# Patient Record
Sex: FEMALE | Race: BLACK OR AFRICAN AMERICAN | NOT HISPANIC OR LATINO | Employment: FULL TIME | URBAN - METROPOLITAN AREA
[De-identification: names, ages, dates, MRNs, and addresses within clinical notes are randomized per-mention and may not be internally consistent; named-entity substitution may affect disease eponyms.]

---

## 2019-11-08 ENCOUNTER — HOSPITAL ENCOUNTER (EMERGENCY)
Facility: HOSPITAL | Age: 34
Discharge: HOME | End: 2019-11-08
Attending: EMERGENCY MEDICINE | Admitting: EMERGENCY MEDICINE
Payer: OTHER MISCELLANEOUS

## 2019-11-08 ENCOUNTER — APPOINTMENT (EMERGENCY)
Dept: RADIOLOGY | Facility: HOSPITAL | Age: 34
End: 2019-11-08
Payer: OTHER MISCELLANEOUS

## 2019-11-08 VITALS
SYSTOLIC BLOOD PRESSURE: 122 MMHG | DIASTOLIC BLOOD PRESSURE: 88 MMHG | HEART RATE: 73 BPM | TEMPERATURE: 98.3 F | RESPIRATION RATE: 18 BRPM | OXYGEN SATURATION: 100 %

## 2019-11-08 DIAGNOSIS — S00.83XA CONTUSION OF JAW, INITIAL ENCOUNTER: ICD-10-CM

## 2019-11-08 DIAGNOSIS — S09.90XA CLOSED HEAD INJURY, INITIAL ENCOUNTER: Primary | ICD-10-CM

## 2019-11-08 PROCEDURE — 70110 X-RAY EXAM OF JAW 4/> VIEWS: CPT

## 2019-11-08 PROCEDURE — 99283 EMERGENCY DEPT VISIT LOW MDM: CPT | Performed by: EMERGENCY MEDICINE

## 2019-11-08 PROCEDURE — 99284 EMERGENCY DEPT VISIT MOD MDM: CPT

## 2019-11-08 RX ORDER — NAPROXEN 500 MG/1
500 TABLET ORAL 2 TIMES DAILY WITH MEALS
Qty: 20 TABLET | Refills: 0 | Status: SHIPPED | OUTPATIENT
Start: 2019-11-08 | End: 2020-03-03
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
## 2020-02-26 ENCOUNTER — OFFICE VISIT (OUTPATIENT)
Dept: URGENT CARE | Facility: CLINIC | Age: 35
End: 2020-02-26

## 2020-02-26 VITALS
WEIGHT: 139 LBS | RESPIRATION RATE: 18 BRPM | SYSTOLIC BLOOD PRESSURE: 122 MMHG | DIASTOLIC BLOOD PRESSURE: 76 MMHG | BODY MASS INDEX: 25.58 KG/M2 | TEMPERATURE: 98.4 F | HEIGHT: 62 IN | OXYGEN SATURATION: 99 % | HEART RATE: 84 BPM

## 2020-02-26 DIAGNOSIS — J02.9 SORE THROAT: Primary | ICD-10-CM

## 2020-02-26 LAB — S PYO AG THROAT QL: NEGATIVE

## 2020-02-26 PROCEDURE — 87070 CULTURE OTHR SPECIMN AEROBIC: CPT | Performed by: PHYSICIAN ASSISTANT

## 2020-02-26 PROCEDURE — G0382 LEV 3 HOSP TYPE B ED VISIT: HCPCS | Performed by: PHYSICIAN ASSISTANT

## 2020-02-26 PROCEDURE — 87880 STREP A ASSAY W/OPTIC: CPT | Performed by: PHYSICIAN ASSISTANT

## 2020-02-26 NOTE — PROGRESS NOTES
[facility]        NAME: Lu Mcadams is a 29 y o  female  : 1985    MRN: [id number]  DATE: 2020  TIME: 6:09 PM    Assessment and Plan   Sore throat [J02 9]  1  Sore throat  POCT rapid strepA    Throat culture         Patient Instructions     Patient Instructions   -Rapid strep test was negative and throat culture is pending- I will call you if it comes back positive  -Use tylenol/motrin as directed  -Salt H20 gargles/throat lozenges  -Increase fluids  -Follow-up with PCP within 5-7 days    Go to ER with worsening symptoms, worsening pain, high fever, difficulty swallowing, or any signs of distress     Follow up with PCP in 3-5 days  Proceed to  ER if symptoms worsen  Chief Complaint     Chief Complaint   Patient presents with    Sore Throat     pt states symptoms started 4 days ago         History of Present Illness       The patient presents today for an evaluation of a sore throat for the past 4 days  The patient rates her pain as a 4/10  She has been taking tylenol and motrin  No fever  She admits to having general malaise and some nausea  LMP was in mid January  Review of Systems   Review of Systems   Constitutional: Negative for chills and fever  HENT: Positive for sore throat  Negative for ear pain and rhinorrhea  Respiratory: Negative for shortness of breath  Cardiovascular: Negative for chest pain  Gastrointestinal: Positive for nausea  Negative for abdominal pain and vomiting  Genitourinary: Negative for dysuria  Musculoskeletal: Negative for arthralgias  Skin: Negative for rash  All other systems reviewed and are negative          Current Medications       Current Outpatient Medications:     naproxen (EC NAPROSYN) 500 MG EC tablet, Take 1 tablet (500 mg total) by mouth 2 (two) times a day with meals for 10 days, Disp: 20 tablet, Rfl: 0    Current Allergies     Allergies as of 2020 - Reviewed 2020   Allergen Reaction Noted    Pepcid [famotidine]  11/08/2019    Shellfish-derived products  11/08/2019            The following portions of the patient's history were reviewed and updated as appropriate: allergies, current medications, past family history, past medical history, past social history, past surgical history and problem list      History reviewed  No pertinent past medical history  Past Surgical History:   Procedure Laterality Date    KNEE ARTHROSCOPY         History reviewed  No pertinent family history  Medications have been verified  Objective   /76   Pulse 84   Temp 98 4 °F (36 9 °C) (Temporal)   Resp 18   Ht 5' 2" (1 575 m)   Wt 63 kg (139 lb)   SpO2 99%   BMI 25 42 kg/m²        Physical Exam     Physical Exam   Constitutional: She is oriented to person, place, and time  She appears well-developed and well-nourished  No distress  HENT:   Head: Normocephalic and atraumatic  Right Ear: Tympanic membrane, external ear and ear canal normal    Left Ear: Tympanic membrane, external ear and ear canal normal    Nose: Nose normal    Mouth/Throat: Uvula is midline and mucous membranes are normal  Posterior oropharyngeal erythema present  Tonsils are 0 on the right  Tonsils are 0 on the left  Eyes: Pupils are equal, round, and reactive to light  Conjunctivae and EOM are normal    Neck: Normal range of motion  Neck supple  Cardiovascular: Normal rate, regular rhythm and normal heart sounds  Pulmonary/Chest: Effort normal and breath sounds normal    Lymphadenopathy:     She has no cervical adenopathy  Neurological: She is alert and oriented to person, place, and time  Skin: Skin is warm and dry  Psychiatric: She has a normal mood and affect  Nursing note and vitals reviewed

## 2020-02-28 LAB — BACTERIA THROAT CULT: NORMAL

## 2020-03-01 ENCOUNTER — TELEPHONE (OUTPATIENT)
Dept: URGENT CARE | Facility: CLINIC | Age: 35
End: 2020-03-01

## 2020-03-01 NOTE — TELEPHONE ENCOUNTER
pt called requesting results of throat culture done at urgent care on 2/26/20  informed pt the throat culture was negative  Pt states she still has a sore throat  Instructed pt to follow up with pcp  Pt states she does not have one  Informed pt she can come to urgent care to be re evaluated or call and set up apt with pcp  Pt verbalized understanding

## 2020-03-03 ENCOUNTER — OFFICE VISIT (OUTPATIENT)
Dept: URGENT CARE | Facility: CLINIC | Age: 35
End: 2020-03-03
Payer: COMMERCIAL

## 2020-03-03 VITALS
HEIGHT: 62 IN | BODY MASS INDEX: 25.58 KG/M2 | OXYGEN SATURATION: 99 % | DIASTOLIC BLOOD PRESSURE: 80 MMHG | SYSTOLIC BLOOD PRESSURE: 102 MMHG | WEIGHT: 139 LBS | RESPIRATION RATE: 18 BRPM | TEMPERATURE: 98.6 F | HEART RATE: 75 BPM

## 2020-03-03 DIAGNOSIS — J02.9 ACUTE PHARYNGITIS, UNSPECIFIED ETIOLOGY: Primary | ICD-10-CM

## 2020-03-03 LAB — S PYO AG THROAT QL: NEGATIVE

## 2020-03-03 PROCEDURE — 87070 CULTURE OTHR SPECIMN AEROBIC: CPT | Performed by: PHYSICIAN ASSISTANT

## 2020-03-03 PROCEDURE — 99213 OFFICE O/P EST LOW 20 MIN: CPT | Performed by: PHYSICIAN ASSISTANT

## 2020-03-03 PROCEDURE — 87880 STREP A ASSAY W/OPTIC: CPT | Performed by: PHYSICIAN ASSISTANT

## 2020-03-03 NOTE — PROGRESS NOTES
[facility]        NAME: Jennifer Watt is a 29 y o  female  : 1985    MRN: [id number]  DATE: March 3, 2020  TIME: 4:47 PM    Assessment and Plan   Acute pharyngitis, unspecified etiology [J02 9]  1  Acute pharyngitis, unspecified etiology  POCT rapid strepA    Throat culture         Patient Instructions   Patient Instructions   Schedule follow up with PCP today  Go to ER with worsening symptoms, difficulty breathing or swallowing  Continue nasal saline  Tylenol and motrin for throat pain  Cool liquids, soft foods  Throat lozenges  Awaiting throat culture results  Follow up with PCP in 3-5 days  Proceed to  ER if symptoms worsen  Chief Complaint     Chief Complaint   Patient presents with    Sore Throat     x 2 weeks  also complains of fatigue  recently found out she was pregnant since she was here last           History of Present Illness       70-year-old female presents to clinic with sore throat x2 weeks  Patient was seen here previously and diagnosed with viral sore throat  Throat culture was negative for strep  She states continues sore throat which she has been taking Tylenol and Motrin with little relief  Patient this coverage she was pregnant recently  She denies difficulty breathing or swallowing  Patient does not have a primary care  Review of Systems   Review of Systems   Constitutional: Positive for fatigue  Negative for chills and fever  HENT: Positive for postnasal drip and sore throat  Negative for congestion, ear pain, rhinorrhea, sinus pressure and voice change  Eyes: Negative for discharge and redness  Respiratory: Negative for cough, shortness of breath and wheezing  Cardiovascular: Negative for chest pain  Gastrointestinal: Negative for diarrhea, nausea and vomiting  Musculoskeletal: Negative for myalgias  Neurological: Negative for dizziness and headaches  Hematological: Negative for adenopathy           Current Medications Current Outpatient Medications:     Prenatal Multivit-Min-Fe-FA (PRE-SUHA PO), Take by mouth, Disp: , Rfl:     Current Allergies     Allergies as of 2020 - Reviewed 2020   Allergen Reaction Noted    Pepcid [famotidine]  2019    Shellfish-derived products  2019            The following portions of the patient's history were reviewed and updated as appropriate: allergies, current medications, past family history, past medical history, past social history, past surgical history and problem list      History reviewed  No pertinent past medical history  Past Surgical History:   Procedure Laterality Date    KNEE ARTHROSCOPY         History reviewed  No pertinent family history  Medications have been verified  Objective   /80 (BP Location: Left arm, Patient Position: Sitting)   Pulse 75   Temp 98 6 °F (37 °C) (Oral)   Resp 18   Ht 5' 2" (1 575 m)   Wt 63 kg (139 lb)   LMP 2020 (LMP Unknown)   SpO2 99%   BMI 25 42 kg/m²        Physical Exam     Physical Exam   Constitutional: She appears well-developed and well-nourished  She does not appear ill  HENT:   Head: Normocephalic and atraumatic  Right Ear: Tympanic membrane normal    Left Ear: Tympanic membrane normal    Nose: Nose normal    Mouth/Throat: Uvula is midline and mucous membranes are normal  Posterior oropharyngeal edema and posterior oropharyngeal erythema present  No oropharyngeal exudate or tonsillar abscesses  Tonsils are 0 on the right  Tonsils are 0 on the left  No tonsillar exudate  Cardiovascular: Normal rate  Pulmonary/Chest: Effort normal    Lymphadenopathy:     She has no cervical adenopathy  Skin: No rash noted  Vitals reviewed

## 2020-03-06 ENCOUNTER — APPOINTMENT (OUTPATIENT)
Dept: LAB | Facility: CLINIC | Age: 35
End: 2020-03-06
Payer: COMMERCIAL

## 2020-03-06 ENCOUNTER — OFFICE VISIT (OUTPATIENT)
Dept: FAMILY MEDICINE CLINIC | Facility: CLINIC | Age: 35
End: 2020-03-06
Payer: COMMERCIAL

## 2020-03-06 VITALS
SYSTOLIC BLOOD PRESSURE: 126 MMHG | HEART RATE: 86 BPM | HEIGHT: 62 IN | OXYGEN SATURATION: 99 % | DIASTOLIC BLOOD PRESSURE: 84 MMHG | TEMPERATURE: 99.6 F | BODY MASS INDEX: 24.92 KG/M2 | WEIGHT: 135.4 LBS

## 2020-03-06 DIAGNOSIS — N92.6 MISSED PERIOD: ICD-10-CM

## 2020-03-06 DIAGNOSIS — J02.9 VIRAL PHARYNGITIS: ICD-10-CM

## 2020-03-06 DIAGNOSIS — Z12.4 SCREENING FOR CERVICAL CANCER: ICD-10-CM

## 2020-03-06 LAB
BACTERIA THROAT CULT: NORMAL
BASOPHILS # BLD AUTO: 0.06 THOUSANDS/ΜL (ref 0–0.1)
BASOPHILS NFR BLD AUTO: 1 % (ref 0–1)
EOSINOPHIL # BLD AUTO: 0.1 THOUSAND/ΜL (ref 0–0.61)
EOSINOPHIL NFR BLD AUTO: 1 % (ref 0–6)
ERYTHROCYTE [DISTWIDTH] IN BLOOD BY AUTOMATED COUNT: 11.7 % (ref 11.6–15.1)
HCG SERPL QL: POSITIVE
HCT VFR BLD AUTO: 38.2 % (ref 34.8–46.1)
HGB BLD-MCNC: 12.7 G/DL (ref 11.5–15.4)
IMM GRANULOCYTES # BLD AUTO: 0.04 THOUSAND/UL (ref 0–0.2)
IMM GRANULOCYTES NFR BLD AUTO: 0 % (ref 0–2)
LYMPHOCYTES # BLD AUTO: 2.63 THOUSANDS/ΜL (ref 0.6–4.47)
LYMPHOCYTES NFR BLD AUTO: 25 % (ref 14–44)
MCH RBC QN AUTO: 30.2 PG (ref 26.8–34.3)
MCHC RBC AUTO-ENTMCNC: 33.2 G/DL (ref 31.4–37.4)
MCV RBC AUTO: 91 FL (ref 82–98)
MONOCYTES # BLD AUTO: 0.75 THOUSAND/ΜL (ref 0.17–1.22)
MONOCYTES NFR BLD AUTO: 7 % (ref 4–12)
NEUTROPHILS # BLD AUTO: 7.08 THOUSANDS/ΜL (ref 1.85–7.62)
NEUTS SEG NFR BLD AUTO: 66 % (ref 43–75)
NRBC BLD AUTO-RTO: 0 /100 WBCS
PLATELET # BLD AUTO: 285 THOUSANDS/UL (ref 149–390)
PMV BLD AUTO: 11.6 FL (ref 8.9–12.7)
RBC # BLD AUTO: 4.21 MILLION/UL (ref 3.81–5.12)
WBC # BLD AUTO: 10.66 THOUSAND/UL (ref 4.31–10.16)

## 2020-03-06 PROCEDURE — 1036F TOBACCO NON-USER: CPT | Performed by: NURSE PRACTITIONER

## 2020-03-06 PROCEDURE — 85025 COMPLETE CBC W/AUTO DIFF WBC: CPT

## 2020-03-06 PROCEDURE — 3008F BODY MASS INDEX DOCD: CPT | Performed by: NURSE PRACTITIONER

## 2020-03-06 PROCEDURE — 36415 COLL VENOUS BLD VENIPUNCTURE: CPT

## 2020-03-06 PROCEDURE — 99203 OFFICE O/P NEW LOW 30 MIN: CPT | Performed by: NURSE PRACTITIONER

## 2020-03-06 PROCEDURE — 86308 HETEROPHILE ANTIBODY SCREEN: CPT

## 2020-03-06 PROCEDURE — 84703 CHORIONIC GONADOTROPIN ASSAY: CPT

## 2020-03-06 NOTE — ASSESSMENT & PLAN NOTE
multiple urgent car visit, will rule out mono  continue supportive care, salt water gargles, pain relief medication  Try flonase at hs which may help with PND and throat irritation

## 2020-03-06 NOTE — PROGRESS NOTES
OFFICE VISIT  Rocio Watt 29 y o  female MRN: [id number]          Assessment / Plan:  Problem List Items Addressed This Visit        Digestive    Viral pharyngitis     multiple urgent car visit, will rule out mono  continue supportive care, salt water gargles, pain relief medication  Try flonase at hs which may help with PND and throat irritation  Relevant Orders    CBC and differential    Mononucleosis screen      Other Visit Diagnoses     Missed period        Relevant Orders    Pregnancy Test (HCG Qualitative)    Screening for cervical cancer        Relevant Orders    Ambulatory referral to Obstetrics / Gynecology            Reason For Visit / Chief Complaint  Chief Complaint   Patient presents with   [facility]      patient here following up with urgent care for sore throat and fatigue  was advised bloodwork possible mono   pt is now pregnant         HPI:  Rocio Watt is a 29 y o  female who presents today to [facility] care  She has been having ongoign sore thorat since feb 20, she was seen twice at urgent care, she was tested for strep, negatgive  She was not treated with any medicaotn  She remains with a sore throat, she has been using tylneo and motired  She teports pain when swallowing  She reports fatigued  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  She reports missing her period, in february, took home pregnancy in which was posiitive  She reports fatigue   She reports morning sickness    Historical Information   Past Medical History:   Diagnosis Date    Hypertension     with pregnancy    Pre-eclampsia      Past Surgical History:   Procedure Laterality Date    KNEE ARTHROSCOPY       Social History   Social History     Substance and Sexual Activity   Alcohol Use Not Currently    Comment: socially     Social History     Substance and Sexual Activity   Drug Use Never     Social History     Tobacco Use   Smoking Status Never Smoker   Smokeless Tobacco Never Used     Family History   Problem Relation Age of Onset    Asthma Mother     Anemia Mother    Soni Sims Arthritis Mother        Meds/Allergies   Allergies   Allergen Reactions    Pepcid [Famotidine]     Shellfish-Derived Products        Meds:    Current Outpatient Medications:     Prenatal Multivit-Min-Fe-FA (PRE-SUHA PO), Take by mouth, Disp: , Rfl:       REVIEW OF SYSTEMS  Review of Systems   Constitutional: Positive for fatigue  Negative for chills and fever  HENT: Positive for postnasal drip and sore throat  Negative for congestion, ear discharge, ear pain, trouble swallowing and voice change  Eyes: Negative for pain and redness  Respiratory: Positive for cough  Negative for chest tightness, shortness of breath and wheezing  Gastrointestinal: Positive for nausea  Negative for abdominal pain, blood in stool, constipation, diarrhea and vomiting  Endocrine: Negative for cold intolerance, heat intolerance, polydipsia, polyphagia and polyuria  Genitourinary: Positive for menstrual problem  Negative for decreased urine volume, dysuria, frequency and urgency  Musculoskeletal: Negative for arthralgias, back pain, myalgias and neck pain  Skin: Negative for color change and rash  Neurological: Negative for dizziness, syncope, weakness, light-headedness, numbness and headaches  Psychiatric/Behavioral: Negative for sleep disturbance and suicidal ideas  The patient is not nervous/anxious  Current Vitals:   Blood Pressure: 126/84 (20 1103)  Pulse: 86 (20 1103)  Temperature: 99 6 °F (37 6 °C) (20 1103)  Height: 5' 2" (157 5 cm) (20 1103)  Weight - Scale: 61 4 kg (135 lb 6 4 oz) (20 1103)  SpO2: 99 % (20 1103)  [unfilled]    PHYSICAL EXAMS:  Physical Exam   Constitutional: She is oriented to person, place, and time  She appears well-developed and well-nourished  HENT:   Head: Normocephalic     Right Ear: External ear normal    Left Ear: External ear normal    Mouth/Throat: Oropharynx is clear and moist        Eyes: Pupils are equal, round, and reactive to light  Conjunctivae are normal    Neck: Neck supple  Cardiovascular: Normal rate and regular rhythm  Pulmonary/Chest: Effort normal and breath sounds normal    Abdominal: Soft  Bowel sounds are normal  She exhibits no distension  There is no tenderness  Musculoskeletal: Normal range of motion  Neurological: She is alert and oriented to person, place, and time  Skin: Skin is warm and dry  Psychiatric: She has a normal mood and affect  Lab, imaging and other studies: I have personally reviewed pertinent reports  Deepali Tom

## 2020-03-09 DIAGNOSIS — Z32.01 POSITIVE PREGNANCY TEST: Primary | ICD-10-CM

## 2020-03-09 LAB — HETEROPH AB SER QL: NEGATIVE

## 2020-05-28 ENCOUNTER — TELEPHONE (OUTPATIENT)
Dept: FAMILY MEDICINE CLINIC | Facility: CLINIC | Age: 35
End: 2020-05-28

## 2020-06-09 ENCOUNTER — TELEPHONE (OUTPATIENT)
Dept: FAMILY MEDICINE CLINIC | Facility: CLINIC | Age: 35
End: 2020-06-09

## 2020-07-01 ENCOUNTER — OFFICE VISIT (OUTPATIENT)
Dept: FAMILY MEDICINE CLINIC | Facility: CLINIC | Age: 35
End: 2020-07-01
Payer: COMMERCIAL

## 2020-07-01 VITALS
WEIGHT: 144 LBS | HEIGHT: 62 IN | HEART RATE: 78 BPM | SYSTOLIC BLOOD PRESSURE: 118 MMHG | DIASTOLIC BLOOD PRESSURE: 70 MMHG | OXYGEN SATURATION: 98 % | BODY MASS INDEX: 26.5 KG/M2

## 2020-07-01 DIAGNOSIS — Z00.00 ANNUAL PHYSICAL EXAM: Primary | ICD-10-CM

## 2020-07-01 PROCEDURE — 99395 PREV VISIT EST AGE 18-39: CPT | Performed by: NURSE PRACTITIONER

## 2020-07-01 PROCEDURE — 3008F BODY MASS INDEX DOCD: CPT | Performed by: NURSE PRACTITIONER

## 2020-07-01 NOTE — PROGRESS NOTES
ADULT ANNUAL [address]    NAME: Jaz lOvera  AGE: 28 y o  SEX: female  : 1985     DATE: 2020     Assessment and Plan:     Problem List Items Addressed This Visit     None      Visit Diagnoses     Annual physical exam    -  Primary          Immunizations and preventive care screenings were discussed with patient today  Appropriate education was printed on patient's after visit summary  Counseling:  Alcohol/drug use: discussed moderation in alcohol intake, the recommendations for healthy alcohol use, and avoidance of illicit drug use  Dental Health: discussed importance of regular tooth brushing, flossing, and dental visits  Injury prevention: discussed safety/seat belts, safety helmets, smoke detectors, carbon dioxide detectors, and smoking near bedding or upholstery  Sexual health: discussed sexually transmitted diseases, partner selection, use of condoms, avoidance of unintended pregnancy, and contraceptive alternatives  · Exercise: the importance of regular exercise/physical activity was discussed  Recommend exercise 3-5 times per week for at least 30 minutes  Return in about 1 year (around 2021)  Chief Complaint:     Chief Complaint   Patient presents with    Physical Exam     pt is in office today for a general physical pt is 23 weeks pregnant      History of Present Illness:     Adult Annual Physical   Patient here for a comprehensive physical exam  The patient reports no problems  She is in need of school physcial for college  She is currently 23 weeks pregnant    Diet and Physical Activity  · Diet/Nutrition: well balanced diet, limited junk food, consuming 3-5 servings of fruits/vegetables daily, adequate fiber intake and going to increase fiber intake / decrease white breads/pastas to eliminate occasional Colace use     · Exercise: no formal exercise and encouraged walking as tolerated, gettting up, moving around, and not remaining sedentary for any length of time         Depression Screening  PHQ-9 Depression Screening    PHQ-9:    Frequency of the following problems over the past two weeks:            General Health  · Sleep: sleeps well, gets 4-6 hours of sleep on average and reports 4-8 hours of sleep, daughter wakes at 7am daily, sleeps well, sometimes difficulty falling asleep      · Hearing: no concerns at this time  · Vision: wears glasses and last exam April 2020, current prescription less than one year old, no concerns with vision at this time      · Dental: no dental visits for >1 year, brushes teeth twice daily, does not floss and denies any current problems, encouraged flossing daily and patient in agreement to find dental home for exam/cleaning as soon as possible  /GYN Health  · Last menstrual period: Approximately January 15th, currently pregnant, following with OB and MFM  · Contraceptive method: N/A  · History of STDs?: no      Review of Systems:     Review of Systems   Constitutional: Negative for chills, fatigue and fever  Morning sickness stopped at about 14 weeks, reports increased dietary intake since  HENT: Negative for congestion, ear discharge, ear pain, sore throat, trouble swallowing and voice change  Eyes: Negative for pain and redness  Respiratory: Negative for cough, chest tightness, shortness of breath and wheezing  Cardiovascular:        See note below  History of a-fib during second pregnancy  Gastrointestinal: Negative for abdominal pain, blood in stool, constipation, diarrhea, nausea and vomiting  Endocrine: Negative for cold intolerance, heat intolerance, polydipsia, polyphagia and polyuria  Genitourinary: Negative for decreased urine volume, dysuria, frequency and urgency  Musculoskeletal: Negative for arthralgias, back pain, myalgias and neck pain  Skin: Negative for color change and rash     Neurological: Negative for dizziness, syncope, weakness, light-headedness, numbness and headaches  Psychiatric/Behavioral: Negative for sleep disturbance and suicidal ideas  The patient is not nervous/anxious  Patient reports history of a-fib and pre-eclampsia with 2nd pregnancy  Following with cardiology  Reports that two week Holter monitor did not capture and a-fib activity  Reports that she will be starting ASA 81mg twice daily per MFM recommendation soon       Past Medical History:     Past Medical History:   Diagnosis Date    Hypertension     with pregnancy    Pre-eclampsia         Past Surgical History:     Past Surgical History:   Procedure Laterality Date    KNEE ARTHROSCOPY        Social History:     E-Cigarette/Vaping    E-Cigarette Use Never User      E-Cigarette/Vaping Substances    Nicotine No     THC No     CBD No     Flavoring No     Other No     Unknown No      Social History     Socioeconomic History    Marital status: Single     Spouse name: None    Number of children: None    Years of education: None    Highest education level: None   Occupational History    None   Social Needs    Financial resource strain: None    Food insecurity:     Worry: None     Inability: None    Transportation needs:     Medical: None     Non-medical: None   Tobacco Use    Smoking status: Never Smoker    Smokeless tobacco: Never Used   Substance and Sexual Activity    Alcohol use: Not Currently     Comment: socially    Drug use: Never    Sexual activity: None   Lifestyle    Physical activity:     Days per week: None     Minutes per session: None    Stress: None   Relationships    Social connections:     Talks on phone: None     Gets together: None     Attends Cheondoism service: None     Active member of club or organization: None     Attends meetings of clubs or organizations: None     Relationship status: None    Intimate partner violence:     Fear of current or ex partner: None     Emotionally abused: None Physically abused: None     Forced sexual activity: None   Other Topics Concern    None   Social History Narrative    None      Family History:     Family History   Problem Relation Age of Onset    Asthma Mother     Anemia Mother     Arthritis Mother       Current Medications:     Current Outpatient Medications   Medication Sig Dispense Refill    Prenatal Multivit-Min-Fe-FA (PRE- PO) Take by mouth       No current facility-administered medications for this visit  Tolerating pre-wang vitamin, per patient recommended to take ASA 81mg twice daily by MFM to reduce risk of pre-eclampsia and a-fib  Patient has not started aspirin  Allergies: Allergies   Allergen Reactions    Pepcid [Famotidine]     Shellfish-Derived Products       Physical Exam:     /70 (BP Location: Left arm, Patient Position: Sitting)   Pulse 78   Ht 5' 2" (1 575 m)   Wt 65 3 kg (144 lb)   LMP 2020 (LMP Unknown)   SpO2 98%   BMI 26 34 kg/m²     Physical Exam   Constitutional: She is oriented to person, place, and time  She appears well-developed and well-nourished  HENT:   Head: Normocephalic and atraumatic  Right Ear: External ear normal    Left Ear: External ear normal    Mouth/Throat: Oropharynx is clear and moist    Eyes: Pupils are equal, round, and reactive to light  Neck: Normal range of motion  Neck supple  Cardiovascular: Normal rate and regular rhythm  Pulmonary/Chest: Effort normal    Abdominal:   Round, 23 weeks pregnant   Musculoskeletal: Normal range of motion  Neurological: She is alert and oriented to person, place, and time  Skin: Skin is warm  Psychiatric: She has a normal mood and affect  Her behavior is normal    Nursing note and vitals reviewed        [address]

## 2020-07-01 NOTE — PATIENT INSTRUCTIONS

## 2020-07-02 ENCOUNTER — HOSPITAL ENCOUNTER (EMERGENCY)
Facility: HOSPITAL | Age: 35
Discharge: HOME | End: 2020-07-02
Attending: EMERGENCY MEDICINE | Admitting: EMERGENCY MEDICINE
Payer: COMMERCIAL

## 2020-07-02 VITALS
TEMPERATURE: 98.3 F | HEIGHT: 62 IN | HEART RATE: 88 BPM | RESPIRATION RATE: 16 BRPM | WEIGHT: 144 LBS | BODY MASS INDEX: 26.5 KG/M2 | SYSTOLIC BLOOD PRESSURE: 105 MMHG | OXYGEN SATURATION: 99 % | DIASTOLIC BLOOD PRESSURE: 66 MMHG

## 2020-07-02 DIAGNOSIS — R00.2 PALPITATIONS: Primary | ICD-10-CM

## 2020-07-02 PROCEDURE — 99285 EMERGENCY DEPT VISIT HI MDM: CPT

## 2020-07-02 PROCEDURE — 99282 EMERGENCY DEPT VISIT SF MDM: CPT | Performed by: EMERGENCY MEDICINE

## 2020-07-02 PROCEDURE — 93005 ELECTROCARDIOGRAM TRACING: CPT

## 2020-07-02 NOTE — ED PROVIDER NOTES
History  Chief Complaint   Patient presents with    Palpitations     Patient reports palpitations on and off since yesterday  Patient reports nausea, fatigue, and "crying alot"  Patient reports she is 23 weeks pregnant and has a history of a-fiob when pregnant  HPI patient is a 35-year-old female,  presents emergency department and apparently off and on has felt some palpitations  Patient reports she had a history of atrial fibrillation with her previous pregnancy  Patient reports she was started on aspirin for that pregnancy when they found she had the atrial fibrillation  Patient reports she had palpitations earlier in this pregnancy she had a Holter monitor which showed no atrial fibrillation  Patient reports she sees a cardiologist and they follow her closely  Patient reports last night she had some extra beats and she reports that night she has this feeling of extra beats so she came to the emergency department  Patient denies any shortness of breath  There is no chest pain  She denies any diaphoresis  Patient denies any pain with exertion  She reports she believes her pregnancy is developing normally  Past medical history of hypertension with pregnancy, preeclampsia, atrial fibrillation in pregnancy, currently pregnant  Family history noncontributory  Social history, nonsmoker no history of drug abuse    Prior to Admission Medications   Prescriptions Last Dose Informant Patient Reported? Taking? Prenatal Multivit-Min-Fe-FA (PRE-SUHA PO)   Yes No   Sig: Take by mouth      Facility-Administered Medications: None       Past Medical History:   Diagnosis Date    Hypertension     with pregnancy    Pre-eclampsia        Past Surgical History:   Procedure Laterality Date    KNEE ARTHROSCOPY         Family History   Problem Relation Age of Onset    Asthma Mother     Anemia Mother     Arthritis Mother      I have reviewed and agree with the history as documented      E-Cigarette/Vaping    E-Cigarette Use Never User      E-Cigarette/Vaping Substances    Nicotine No     THC No     CBD No     Flavoring No     Other No     Unknown No      Social History     Tobacco Use    Smoking status: Never Smoker    Smokeless tobacco: Never Used   Substance Use Topics    Alcohol use: Not Currently     Comment: socially    Drug use: Never       Review of Systems   Constitutional: Negative for diaphoresis, fatigue and fever  HENT: Negative for congestion, ear pain, nosebleeds and sore throat  Eyes: Negative for photophobia, pain, discharge and visual disturbance  Respiratory: Negative for cough, choking, chest tightness, shortness of breath and wheezing  Cardiovascular: Positive for palpitations  Negative for chest pain  Gastrointestinal: Negative for abdominal distention, abdominal pain, diarrhea and vomiting  Genitourinary: Negative for dysuria, flank pain and frequency  Musculoskeletal: Negative for back pain, gait problem and joint swelling  Skin: Negative for color change and rash  Neurological: Negative for dizziness, syncope and headaches  Psychiatric/Behavioral: Negative for behavioral problems and confusion  The patient is not nervous/anxious  All other systems reviewed and are negative  Physical Exam  Physical Exam   Constitutional: She is oriented to person, place, and time  She appears well-developed and well-nourished  HENT:   Head: Normocephalic  Right Ear: External ear normal    Left Ear: External ear normal    Nose: Nose normal    Mouth/Throat: Oropharynx is clear and moist    Eyes: Pupils are equal, round, and reactive to light  EOM and lids are normal    Neck: Normal range of motion  Neck supple  Cardiovascular: Normal rate, regular rhythm, normal heart sounds and intact distal pulses  Pulmonary/Chest: Effort normal and breath sounds normal  No respiratory distress  Abdominal: Soft   Bowel sounds are normal    Gravid uterus, nontender, bedside ultrasound shows a active moving 3rd trimester fetus with fetal heart rate of 140   Musculoskeletal: Normal range of motion  She exhibits no deformity  Neurological: She is alert and oriented to person, place, and time  Skin: Skin is warm and dry  Psychiatric: She has a normal mood and affect  Nursing note and vitals reviewed  Pulse oximetry 98% on room air adequate oxygenation, no hypoxia    Vital Signs  ED Triage Vitals [07/02/20 1910]   Temperature Pulse Respirations Blood Pressure SpO2   98 3 °F (36 8 °C) 86 18 105/68 98 %      Temp Source Heart Rate Source Patient Position - Orthostatic VS BP Location FiO2 (%)   Oral Monitor Lying Left arm --      Pain Score       --           Vitals:    07/02/20 1910 07/02/20 2000   BP: 105/68 105/66   Pulse: 86 88   Patient Position - Orthostatic VS: Lying Lying         Visual Acuity      ED Medications  Medications - No data to display    Diagnostic Studies  Results Reviewed     None                 No orders to display              Procedures  Procedures         ED Course       US AUDIT      Most Recent Value   Initial Alcohol Screen: US AUDIT-C    1  How often do you have a drink containing alcohol?  0 Filed at: 07/02/2020 1911   2  How many drinks containing alcohol do you have on a typical day you are drinking? 0 Filed at: 07/02/2020 1911   3a  Male UNDER 65: How often do you have five or more drinks on one occasion? 0 Filed at: 07/02/2020 1911   3b  FEMALE Any Age, or MALE 65+: How often do you have 4 or more drinks on one occassion? 0 Filed at: 07/02/2020 1911   Audit-C Score  0 Filed at: 07/02/2020 1911                  ABDIAZIZ/DAST-10      Most Recent Value   How many times in the past year have you    Used an illegal drug or used a prescription medication for non-medical reasons?   Never Filed at: 07/02/2020 1911                    The patient was placed on a monitor In the emergency department and monitored she was in normal sinus rhythm the whole time period patient had no ectopic beats there was no atrial fibrillation  MDM medical decision making 51-year-old female, currently pregnant apparently had atrial fibrillation with her 1st pregnancy Holter monitor during this pregnancy showed node atrial fibrillation  Patient reports some funny beats during the night at times  Monitoring here shows normal sinus rhythm no atrial fibrillation  Discussed with patient she may require Holter monitoring to decide if her palpitations are atrial fibrillation at this point there is no sign of atrial fibrillation  I discussed with her she does have a cardiologist she can follow up with her cardiologist they can provide her with Holter monitor which may evaluate and figure out what her palpitations are at this point she has a normal sinus rhythm here in the emergency department there was no indication for further testing  Is no indication for ongoing monitoring here  When patient had atrial fibrillation previously she was primarily treated with aspirin  No indication to start medications at this time as the patient is not in atrial fibrillation  Discussed outpatient treatment follow-up- discussed indications to return  Disposition  Final diagnoses:   Palpitations     Time reflects when diagnosis was documented in both MDM as applicable and the Disposition within this note     Time User Action Codes Description Comment    2020  8:03 PM Roz Ghosh Add [R00 2] Palpitations       ED Disposition     ED Disposition Condition Date/Time Comment    Discharge Stable Thu 2020  8:03 PM Marylene Alken discharge to home/self care  Follow-up Information    None         Discharge Medication List as of 2020  8:04 PM      CONTINUE these medications which have NOT CHANGED    Details   Prenatal Multivit-Min-Fe-FA (PRE- PO) Take by mouth, Historical Med           No discharge procedures on file      PDMP Review       Value Time User    PDMP Reviewed Yes 11/8/2019  7:26 AM Michael Yang MD          ED Provider  Electronically Signed by           Neno Ventura MD  07/04/20 6698

## 2020-07-03 LAB
ATRIAL RATE: 83 BPM
P AXIS: 43 DEGREES
PR INTERVAL: 130 MS
QRS AXIS: 38 DEGREES
QRSD INTERVAL: 82 MS
QT INTERVAL: 356 MS
QTC INTERVAL: 418 MS
T WAVE AXIS: 7 DEGREES
VENTRICULAR RATE: 83 BPM

## 2020-07-03 PROCEDURE — 93010 ELECTROCARDIOGRAM REPORT: CPT | Performed by: INTERNAL MEDICINE

## 2020-07-03 NOTE — DISCHARGE INSTRUCTIONS
Increase liquids  Follow up with your cardiologist for repeat heart monitoring  Follow up with your obstetrician,  For acute emergencies follow-up at the obstetrical hospital

## 2021-01-27 ENCOUNTER — OFFICE VISIT (OUTPATIENT)
Dept: FAMILY MEDICINE CLINIC | Facility: CLINIC | Age: 36
End: 2021-01-27
Payer: COMMERCIAL

## 2021-01-27 VITALS
TEMPERATURE: 97.8 F | DIASTOLIC BLOOD PRESSURE: 70 MMHG | HEIGHT: 62 IN | SYSTOLIC BLOOD PRESSURE: 120 MMHG | WEIGHT: 160 LBS | BODY MASS INDEX: 29.44 KG/M2 | HEART RATE: 80 BPM | OXYGEN SATURATION: 98 %

## 2021-01-27 DIAGNOSIS — Z00.00 ANNUAL PHYSICAL EXAM: Primary | ICD-10-CM

## 2021-01-27 PROBLEM — J02.9 VIRAL PHARYNGITIS: Status: RESOLVED | Noted: 2020-03-06 | Resolved: 2021-01-27

## 2021-01-27 PROBLEM — Z82.79 FAMILY HISTORY OF CONGENITAL HEART DEFECT: Status: ACTIVE | Noted: 2020-03-30

## 2021-01-27 PROBLEM — I48.91 ATRIAL FIBRILLATION (HCC): Status: ACTIVE | Noted: 2020-03-30

## 2021-01-27 PROCEDURE — 99395 PREV VISIT EST AGE 18-39: CPT | Performed by: NURSE PRACTITIONER

## 2021-01-27 PROCEDURE — 3008F BODY MASS INDEX DOCD: CPT | Performed by: NURSE PRACTITIONER

## 2021-01-27 PROCEDURE — 3725F SCREEN DEPRESSION PERFORMED: CPT | Performed by: NURSE PRACTITIONER

## 2021-01-27 PROCEDURE — 1036F TOBACCO NON-USER: CPT | Performed by: NURSE PRACTITIONER

## 2021-01-27 NOTE — PROGRESS NOTES
ADULT ANNUAL [address]    NAME: Zakia Cobos  AGE: 28 y o  SEX: female  : 1985     DATE: 2021     Assessment and Plan:     Problem List Items Addressed This Visit     None      Visit Diagnoses     Annual physical exam    -  Primary          Immunizations and preventive care screenings were discussed with patient today  Appropriate education was printed on patient's after visit summary  Counseling:  Alcohol/drug use: discussed moderation in alcohol intake, the recommendations for healthy alcohol use, and avoidance of illicit drug use  Dental Health: discussed importance of regular tooth brushing, flossing, and dental visits  Injury prevention: discussed safety/seat belts, safety helmets, smoke detectors, carbon dioxide detectors, and smoking near bedding or upholstery  Sexual health: discussed sexually transmitted diseases, partner selection, use of condoms, avoidance of unintended pregnancy, and contraceptive alternatives  · Exercise: the importance of regular exercise/physical activity was discussed  Recommend exercise 3-5 times per week for at least 30 minutes  No follow-ups on file  Chief Complaint:     Chief Complaint   Patient presents with    Physical Exam     needs forms filled out for school       History of Present Illness:     Adult Annual Physical   Patient here for a comprehensive physical exam  The patient reports no problems  She is in  Need of forms for school, needing extra time for testing and havign the ability to pump, currently breastfeeding  Diet and Physical Activity  · Diet/Nutrition: well balanced diet  · Exercise: no formal exercise        Depression Screening  PHQ-9 Depression Screening    PHQ-9:   Frequency of the following problems over the past two weeks:      Little interest or pleasure in doing things: 1 - several days  Feeling down, depressed, or hopeless: 1 - several days  PHQ-2 Score: 2       General Health  · Sleep: sleeps well  · Hearing: normal - bilateral   · Vision: wears glasses  · Dental: regular dental visits  /GYN Health  · Last menstrual period: 1/26/21  · Contraceptive method: none  · History of STDs?: no      Review of Systems:     Review of Systems   Constitutional: Negative for activity change, chills, fatigue and fever  HENT: Negative for congestion, ear discharge, ear pain, sinus pressure, sinus pain, sore throat, tinnitus and trouble swallowing  Eyes: Negative for photophobia, pain, discharge, itching and visual disturbance  Respiratory: Negative for cough, chest tightness, shortness of breath and wheezing  Cardiovascular: Negative for chest pain and leg swelling  Gastrointestinal: Negative for abdominal distention, abdominal pain, constipation, diarrhea, nausea and vomiting  Endocrine: Negative for polydipsia, polyphagia and polyuria  Genitourinary: Negative for dysuria and frequency  Musculoskeletal: Negative for arthralgias, myalgias, neck pain and neck stiffness  Skin: Negative for color change  Neurological: Negative for dizziness, syncope, weakness, numbness and headaches  Hematological: Does not bruise/bleed easily  Psychiatric/Behavioral: Negative for behavioral problems, confusion, self-injury, sleep disturbance and suicidal ideas  The patient is not nervous/anxious         Past Medical History:     Past Medical History:   Diagnosis Date    Hypertension     with pregnancy    Pre-eclampsia       Past Surgical History:     Past Surgical History:   Procedure Laterality Date    KNEE ARTHROSCOPY        Social History:     E-Cigarette/Vaping    E-Cigarette Use Never User      E-Cigarette/Vaping Substances    Nicotine No     THC No     CBD No     Flavoring No     Other No     Unknown No      Social History     Socioeconomic History    Marital status: Single     Spouse name: None    Number of children: None    Years of education: None    Highest education level: None   Occupational History    None   Social Needs    Financial resource strain: None    Food insecurity     Worry: None     Inability: None    Transportation needs     Medical: None     Non-medical: None   Tobacco Use    Smoking status: Never Smoker    Smokeless tobacco: Never Used   Substance and Sexual Activity    Alcohol use: Not Currently     Comment: socially    Drug use: Never    Sexual activity: None   Lifestyle    Physical activity     Days per week: None     Minutes per session: None    Stress: None   Relationships    Social connections     Talks on phone: None     Gets together: None     Attends Anglican service: None     Active member of club or organization: None     Attends meetings of clubs or organizations: None     Relationship status: None    Intimate partner violence     Fear of current or ex partner: None     Emotionally abused: None     Physically abused: None     Forced sexual activity: None   Other Topics Concern    None   Social History Narrative    None      Family History:     Family History   Problem Relation Age of Onset    Asthma Mother     Anemia Mother     Arthritis Mother       Current Medications:     Current Outpatient Medications   Medication Sig Dispense Refill    Prenatal Multivit-Min-Fe-FA (PRE-SUHA PO) Take by mouth       No current facility-administered medications for this visit  Allergies: Allergies   Allergen Reactions    Pepcid [Famotidine]     Shellfish-Derived Products       Physical Exam:     /70 (BP Location: Left arm, Patient Position: Sitting)   Pulse 80   Temp 97 8 °F (36 6 °C)   Ht 5' 2" (1 575 m)   Wt 72 6 kg (160 lb)   LMP 2020 (LMP Unknown)   SpO2 98%   BMI 29 26 kg/m²     Physical Exam  Constitutional:       Appearance: Normal appearance  She is well-developed  HENT:      Head: Normocephalic        Right Ear: Tympanic membrane, ear canal and external ear normal       Left Ear: Tympanic membrane, ear canal and external ear normal       Nose: Nose normal  No congestion or rhinorrhea  Mouth/Throat:      Mouth: Mucous membranes are moist       Pharynx: No oropharyngeal exudate or posterior oropharyngeal erythema  Eyes:      Extraocular Movements: Extraocular movements intact  Conjunctiva/sclera: Conjunctivae normal       Pupils: Pupils are equal, round, and reactive to light  Neck:      Musculoskeletal: Normal range of motion and neck supple  Cardiovascular:      Rate and Rhythm: Normal rate and regular rhythm  Pulses: Normal pulses  Heart sounds: Normal heart sounds  Pulmonary:      Effort: Pulmonary effort is normal       Breath sounds: Normal breath sounds  No wheezing or rhonchi  Abdominal:      General: Bowel sounds are normal  There is no distension  Palpations: Abdomen is soft  Tenderness: There is no abdominal tenderness  Musculoskeletal: Normal range of motion  General: No swelling, tenderness, deformity or signs of injury  Right lower leg: No edema  Left lower leg: No edema  Skin:     General: Skin is warm and dry  Findings: No bruising, erythema, lesion or rash  Neurological:      General: No focal deficit present  Mental Status: She is alert and oriented to person, place, and time  Psychiatric:         Mood and Affect: Mood normal          Behavior: Behavior normal          Thought Content:  Thought content normal          Judgment: Judgment normal           Estela Crawford, [address]

## 2021-01-27 NOTE — PATIENT INSTRUCTIONS

## 2021-04-07 ENCOUNTER — OFFICE VISIT (OUTPATIENT)
Dept: FAMILY MEDICINE CLINIC | Facility: CLINIC | Age: 36
End: 2021-04-07
Payer: COMMERCIAL

## 2021-04-07 DIAGNOSIS — L20.82 FLEXURAL ECZEMA: Primary | ICD-10-CM

## 2021-04-07 PROCEDURE — 99213 OFFICE O/P EST LOW 20 MIN: CPT | Performed by: NURSE PRACTITIONER

## 2021-04-07 RX ORDER — TRIAMCINOLONE ACETONIDE 5 MG/G
CREAM TOPICAL 2 TIMES DAILY
Qty: 30 G | Refills: 3 | Status: SHIPPED | OUTPATIENT
Start: 2021-04-07 | End: 2022-03-03 | Stop reason: ALTCHOICE

## 2021-04-07 NOTE — PATIENT INSTRUCTIONS
Eczema   WHAT YOU NEED TO KNOW:   Eczema, or atopic dermatitis, is an itchy, red skin rash  It is a long-term condition that may cause flare-ups for the rest of your life  DISCHARGE INSTRUCTIONS:   Return to the emergency department if:   · You develop a fever or have red streaks going up your arm or leg  · Your rash gets more swollen, red, or hot    Contact your healthcare provider if:   · Most of your skin is red, swollen, painful, and covered with scales  · You develop bloody, red, painful crusts  · Your skin blisters and oozes white or yellow pus  · You have questions about your condition or care  Medicines:   · Medicines , such as immunosuppressants, help reduce itching, redness, pain, and swelling  They may be given as a cream or pill  You may also receive antihistamines to reduce itching, or antibiotics if you have a skin infection  · Take your medicine as directed  Contact your healthcare provider if you think your medicine is not helping or if you have side effects  Tell him of her if you are allergic to any medicine  Keep a list of the medicines, vitamins, and herbs you take  Include the amounts, and when and why you take them  Bring the list or the pill bottles to follow-up visits  Carry your medicine list with you in case of an emergency  Manage eczema:   · Do not scratch  Pat or press on your skin for relief from itching  Your symptoms will get worse if you scratch  Keep your fingernails short so you do not tear your skin if you do scratch  · Keep your skin moist   Rub lotion, cream or ointment into your skin right after a bath or shower when your skin is still damp  Ask your healthcare provider what to use and how often to use it  · Take baths or showers  with warm water for 10 minutes or less  Use mild bar soap  Ask your healthcare provider for the best soap for you to use  · Wear cotton clothes  Wear loose-fitting clothes made from cotton or cotton blends   Avoid wool      · Use a humidifier  to add moisture to the air in your home  · Avoid changes in temperature , especially activities that cause you to sweat a lot because this can cause itching  Remove blankets from your bed if you get hot while you sleep  · Avoid allergens, dust, and skin irritants  Do not let pets inside your home  Do not use perfume, fabric softener, or makeup that burns or itches  Follow up with your healthcare provider as directed:  Write down your questions so you remember to ask them during your visits  © Copyright [address] Information is for End User's use only and may not be sold, redistributed or otherwise used for commercial purposes  All illustrations and images included in CareNotes® are the copyrighted property of [employer]  or [facility] Beverly Ramirez   The above information is an  only  It is not intended as medical advice for individual conditions or treatments  Talk to your doctor, nurse or pharmacist before following any medical regimen to see if it is safe and effective for you

## 2021-04-07 NOTE — PROGRESS NOTES
OFFICE VISIT  Chapo Paige 39 y o  female MRN: [id number]    BMI Counseling: Body mass index is 29 26 kg/m²  The BMI is above normal  Nutrition recommendations include decreasing portion sizes  Exercise recommendations include moderate physical activity 150 minutes/week  Assessment / Plan:  Problem List Items Addressed This Visit        Musculoskeletal and Integument    Flexural eczema - Primary       May use topical cream twice daily  Reading instructions given for at home care for eczema         Relevant Medications    triamcinolone (KENALOG) 0 5 % cream            Reason For Visit / Chief Complaint  Chief Complaint   Patient presents with    Rash     patient has a rash on right hand     Eczema        HPI:  Chapo Paige is a 39 y o  female who presents today for rash to left hand  She reports this rash is itchy has been worsening over the last couple weeks  She reports a known history of eczema although she has had no recent flare-ups      Historical Information   Past Medical History:   Diagnosis Date    Hypertension     with pregnancy    Pre-eclampsia      Past Surgical History:   Procedure Laterality Date    KNEE ARTHROSCOPY       Social History   Social History     Substance and Sexual Activity   Alcohol Use Not Currently    Comment: socially     Social History     Substance and Sexual Activity   Drug Use Never     Social History     Tobacco Use   Smoking Status Never Smoker   Smokeless Tobacco Never Used     Family History   Problem Relation Age of Onset    Asthma Mother     Anemia Mother     Arthritis Mother        Meds/Allergies   Allergies   Allergen Reactions    Pepcid [Famotidine]     Shellfish-Derived Products - Food Allergy        Meds:    Current Outpatient Medications:     triamcinolone (KENALOG) 0 5 % cream, Apply topically 2 (two) times a day, Disp: 30 g, Rfl: 3      REVIEW OF SYSTEMS  Review of Systems   Constitutional: Negative for activity change, chills, fatigue and fever    HENT: Negative for congestion, ear discharge, ear pain, sinus pressure, sinus pain, sore throat, tinnitus and trouble swallowing  Eyes: Negative for photophobia, pain, discharge, itching and visual disturbance  Respiratory: Negative for cough, chest tightness, shortness of breath and wheezing  Cardiovascular: Negative for chest pain and leg swelling  Gastrointestinal: Negative for abdominal distention, abdominal pain, constipation, diarrhea, nausea and vomiting  Endocrine: Negative for polydipsia, polyphagia and polyuria  Genitourinary: Negative for dysuria and frequency  Musculoskeletal: Negative for arthralgias, myalgias, neck pain and neck stiffness  Skin: Positive for rash  Negative for color change  Neurological: Negative for dizziness, syncope, weakness, numbness and headaches  Hematological: Does not bruise/bleed easily  Psychiatric/Behavioral: Negative for behavioral problems, confusion, self-injury, sleep disturbance and suicidal ideas  The patient is not nervous/anxious  Current Vitals:   Blood Pressure: 112/66 (04/07/21 1058)  Pulse: 62 (04/07/21 1058)  Temperature: 99 2 °F (37 3 °C) (04/07/21 1058)  Height: 5' 2" (157 5 cm) (04/07/21 1058)  Weight - Scale: 72 6 kg (160 lb) (04/07/21 1058)  SpO2: 96 % (04/07/21 1058)  [unfilled]    PHYSICAL EXAMS:  Physical Exam  Constitutional:       Appearance: Normal appearance  She is well-developed  HENT:      Head: Normocephalic  Right Ear: Tympanic membrane, ear canal and external ear normal       Left Ear: Tympanic membrane, ear canal and external ear normal       Nose: Nose normal  No congestion or rhinorrhea  Mouth/Throat:      Mouth: Mucous membranes are moist       Pharynx: No oropharyngeal exudate or posterior oropharyngeal erythema  Eyes:      Extraocular Movements: Extraocular movements intact        Conjunctiva/sclera: Conjunctivae normal       Pupils: Pupils are equal, round, and reactive to light    Neck:      Musculoskeletal: Normal range of motion and neck supple  Cardiovascular:      Rate and Rhythm: Normal rate and regular rhythm  Pulses: Normal pulses  Heart sounds: Normal heart sounds  Pulmonary:      Effort: Pulmonary effort is normal       Breath sounds: Normal breath sounds  No wheezing or rhonchi  Abdominal:      General: Bowel sounds are normal  There is no distension  Palpations: Abdomen is soft  Tenderness: There is no abdominal tenderness  Musculoskeletal: Normal range of motion  General: No swelling, tenderness, deformity or signs of injury  Right lower leg: No edema  Left lower leg: No edema  Skin:     General: Skin is warm and dry  Findings: Rash present  No bruising, erythema or lesion  Comments: Left hand rash noted   Neurological:      General: No focal deficit present  Mental Status: She is alert and oriented to person, place, and time  Psychiatric:         Mood and Affect: Mood normal          Behavior: Behavior normal          Thought Content: Thought content normal          Judgment: Judgment normal              Lab, imaging and other studies: I have personally reviewed pertinent reports  Velasquez Johnson

## 2021-05-12 ENCOUNTER — TELEPHONE (OUTPATIENT)
Dept: ADMINISTRATIVE | Facility: OTHER | Age: 36
End: 2021-05-12

## 2021-05-12 NOTE — TELEPHONE ENCOUNTER
----- Message from Ekaterina Dueñas MA sent at 5/12/2021  9:09 AM EDT -----  Good Morning,  Can someone please take a look patients BMI  Ty  ----- Message -----  From: Roderick Medrano  Sent: 5/6/2021   9:39 AM EDT  To: Case Anand MA, #    Her BMI states not met, but she should be excluded, can this be sent to [facility] for an explanation, I was unable to do bmi followup at her last visit in April 2021

## 2021-06-08 ENCOUNTER — TELEPHONE (OUTPATIENT)
Dept: ADMINISTRATIVE | Facility: OTHER | Age: 36
End: 2021-06-08

## 2021-06-08 NOTE — TELEPHONE ENCOUNTER
----- Message from Bee Mercedes MA sent at 6/4/2021  2:39 PM EDT -----  Regarding: BMI capture  06/04/21 2:40 PM    T[place], our patient Mirtha Montes De Oca has had BMI/performed  Please assist in updating the patient chart by pulling the document from Health Maintenance Tab within Chart Review  The date of service is 4/7/21    Thank you,  LAVELLE Daly PG this was captured and is coming up in our audits as "not captured" is this something you would be able to fix?

## 2021-06-08 NOTE — TELEPHONE ENCOUNTER
Upon review of the In Basket request we have noted this is a NON VALUE BASED item  We are unable to complete this request  Our team has the capability to assist in retrieval, updating, and linking of the following items: Chlamydia, CRC Cologuard, CRC Colonoscopy, CRC CT Colonography, CRC FIT/FOBT(3), CRC Sigmoidoscopy, CT Lungs Screening, DEXA Scan, Diabetic Eye Exam, Diabetic Foot Exam, Hemoglobin A1c, Hepatitis C, HIV (cannot retrieve), Immunizations, Lead, Mammogram, Medicare AWV, Pap Smear (HPV),  and Urine Microalbumin  Any additional questions or concerns should be emailed to the Practice Liaisons via [email or web address] email, please do not reply via In Basket      Thank you  Ed Hidalgo MA

## 2021-06-09 VITALS
TEMPERATURE: 99.2 F | HEIGHT: 62 IN | BODY MASS INDEX: 29.44 KG/M2 | OXYGEN SATURATION: 96 % | DIASTOLIC BLOOD PRESSURE: 66 MMHG | WEIGHT: 160 LBS | SYSTOLIC BLOOD PRESSURE: 112 MMHG | HEART RATE: 62 BPM

## 2021-07-13 ENCOUNTER — HOSPITAL ENCOUNTER (EMERGENCY)
Facility: HOSPITAL | Age: 36
Discharge: HOME | End: 2021-07-14
Attending: EMERGENCY MEDICINE | Admitting: EMERGENCY MEDICINE
Payer: COMMERCIAL

## 2021-07-13 DIAGNOSIS — I10 HYPERTENSION: ICD-10-CM

## 2021-07-13 DIAGNOSIS — R51.9 HEADACHE: Primary | ICD-10-CM

## 2021-07-13 PROCEDURE — 99284 EMERGENCY DEPT VISIT MOD MDM: CPT | Performed by: EMERGENCY MEDICINE

## 2021-07-13 PROCEDURE — 99284 EMERGENCY DEPT VISIT MOD MDM: CPT

## 2021-07-14 ENCOUNTER — APPOINTMENT (EMERGENCY)
Dept: CT IMAGING | Facility: HOSPITAL | Age: 36
End: 2021-07-14
Payer: COMMERCIAL

## 2021-07-14 VITALS
HEART RATE: 78 BPM | DIASTOLIC BLOOD PRESSURE: 59 MMHG | TEMPERATURE: 98.2 F | HEIGHT: 62 IN | RESPIRATION RATE: 18 BRPM | OXYGEN SATURATION: 100 % | SYSTOLIC BLOOD PRESSURE: 91 MMHG | WEIGHT: 160 LBS | BODY MASS INDEX: 29.44 KG/M2

## 2021-07-14 LAB
EXT PREG TEST URINE: NEGATIVE
EXT. CONTROL ED NAV: NORMAL

## 2021-07-14 PROCEDURE — 81025 URINE PREGNANCY TEST: CPT | Performed by: EMERGENCY MEDICINE

## 2021-07-14 PROCEDURE — 70450 CT HEAD/BRAIN W/O DYE: CPT

## 2021-07-14 RX ORDER — ONDANSETRON 4 MG/1
4 TABLET, ORALLY DISINTEGRATING ORAL ONCE
Status: COMPLETED | OUTPATIENT
Start: 2021-07-14 | End: 2021-07-14

## 2021-07-14 RX ORDER — IBUPROFEN 400 MG/1
400 TABLET ORAL ONCE
Status: COMPLETED | OUTPATIENT
Start: 2021-07-14 | End: 2021-07-14

## 2021-07-14 RX ADMIN — IBUPROFEN 400 MG: 400 TABLET ORAL at 00:42

## 2021-07-14 RX ADMIN — ONDANSETRON 4 MG: 4 TABLET, ORALLY DISINTEGRATING ORAL at 00:42

## 2021-07-14 NOTE — ED PROVIDER NOTES
History  Chief Complaint   Patient presents with    High Blood Pressure     pt states she had on and off headaches today  Took her bp at home and it was elevated  Stated on her here her headache went away  No hx of hypertension but hx of preclampsia with pregnancy  39 y o  female presents with several hours of right sided headache with radiation to the right posterior  Described as mild pain that came on gradually, has been waxing and waning, and continues in the ER  Patient states light and noises worsens the pain and ibuprofen improves the pain  Patient denies aura  Patient denies maximal intensity of the headache within minutes of onset  Patient denies exertional component to the headache  Patient states her headache has been waxing and waning throughout the day but improving with ibuprofen  Patient states she took ibuprofen prior to come the emergency room and headache is nearly resolved  Patient states she took her blood pressure and it was elevated so she came to the emergency room for further evaluation treatment  Patient denies a history of hypertension other than during pregnancy  Patient denies any concerns for CO exposure  Patient denies any recent tick bites  Patient denies any recent cervical manipulation  Patient denies any recent trauma  Patient denies any history of connective tissue disorders  Patient denies any history or family history of SAH  Patient denies any history of immunocompromised state  Patient denies any use of illicit drugs  Patient affirms nausea without vomiting  Patient denies any fever or chills  Patient denies any visual changes  Patient denies any sensory changes  Patient denies any focal weakness  ROS: patient denies seizure, weight loss, confusion, diaphoresis, neck pain/stiffness, facial pain, speech difficulty, gait disturbance, vertigo, lightheadedness, jaw claudication, syncope    All other review of systems reviewed and noted to be negative  Focused Objective:  Eyes:  PERRL, EOMI  No nystagmus with gaze fixation  HENT: Atraumatic  Pharynx moist and non-erythematous  No tenderness or swelling over the temporal arteries  Neck: no carotid bruit, no tenderness to palpitation  Able to touch chin to chest   Negative jolt accentuation testing  Neuro:  Alert and answers questions appropriately  No dysarthria  Normal tandem gait, including toe walking and heel walking  Normal Romberg exam   No pronator drift  Normal finger to nose  Normal fine motor function with rapid finger movements  Normal hand tap  Cranial nerves II through XII grossly intact  Visual fields grossly intact  Upper and lower motor strength 5/5 and symmetric  Normal light touch sensory exam    Normal DTRs  Medical Decision Making  Patient presenting with headache representing a broad differential      Considering patient's history and examination, suggested laboratory evaluation and imaging while performing symptomatic management  Extensive discussion with the patient who is declining placement of IV access which I emphasized limits evaluation though the patient states headache has been improving  Patient is amenable to noncontrast CT imaging and treatment with oral medications  Patient appears to have competency in making her medical decisions  I have discussed risks in detail with the patient and alternatives  Will monitor patient, reassess, re-evaluate        History provided by:  Patient  Headache  Pain location:  R temporal  Quality: "pain"  Radiates to:  R neck  Onset quality:  Gradual  Timing:  Constant  Progression:  Waxing and waning  Relieved by:  NSAIDs  Worsened by:  Light and sound  Associated symptoms: nausea    Associated symptoms: no abdominal pain, no back pain, no blurred vision, no congestion, no cough, no diarrhea, no dizziness, no drainage, no ear pain, no facial pain, no fatigue, no fever, no focal weakness, no hearing loss, no loss of balance, no myalgias, no near-syncope, no neck pain, no neck stiffness, no numbness, no paresthesias, no photophobia, no seizures, no sinus pressure, no sore throat, no swollen glands, no syncope, no tingling, no visual change, no vomiting and no weakness        Prior to Admission Medications   Prescriptions Last Dose Informant Patient Reported? Taking?   triamcinolone (KENALOG) 0 5 % cream   No No   Sig: Apply topically 2 (two) times a day      Facility-Administered Medications: None       Past Medical History:   Diagnosis Date    Hypertension     with pregnancy    Pre-eclampsia        Past Surgical History:   Procedure Laterality Date    KNEE ARTHROSCOPY         Family History   Problem Relation Age of Onset    Asthma Mother     Anemia Mother     Arthritis Mother      I have reviewed and agree with the history as documented  E-Cigarette/Vaping    E-Cigarette Use Never User      E-Cigarette/Vaping Substances    Nicotine No     THC No     CBD No     Flavoring No     Other No     Unknown No      Social History     Tobacco Use    Smoking status: Never Smoker    Smokeless tobacco: Never Used   Vaping Use    Vaping Use: Never used   Substance Use Topics    Alcohol use: Not Currently     Comment: socially    Drug use: Never       Review of Systems   Constitutional: Negative for fatigue and fever  HENT: Negative for congestion, ear pain, hearing loss, postnasal drip, sinus pressure and sore throat  Eyes: Negative for blurred vision and photophobia  Respiratory: Negative for cough  Cardiovascular: Negative for syncope and near-syncope  Gastrointestinal: Positive for nausea  Negative for abdominal pain, diarrhea and vomiting  Musculoskeletal: Negative for back pain, myalgias, neck pain and neck stiffness  Neurological: Positive for headaches  Negative for dizziness, focal weakness, seizures, weakness, numbness, paresthesias and loss of balance     All other systems reviewed and are negative  Physical Exam  Physical Exam  Vitals reviewed  Constitutional:       Appearance: She is well-developed  HENT:      Mouth/Throat:      Mouth: Mucous membranes are moist    Eyes:      General: No scleral icterus  Pupils: Pupils are equal, round, and reactive to light  Cardiovascular:      Rate and Rhythm: Normal rate and regular rhythm  Pulses: Normal pulses  Pulmonary:      Effort: Pulmonary effort is normal    Abdominal:      Palpations: Abdomen is soft  Tenderness: There is no abdominal tenderness  There is no guarding or rebound  Musculoskeletal:      Cervical back: Neck supple  Skin:     General: Skin is warm and dry  Neurological:      General: No focal deficit present  Mental Status: She is alert and oriented to person, place, and time  Cranial Nerves: No cranial nerve deficit  Sensory: No sensory deficit  Motor: No weakness        Coordination: Coordination normal       Gait: Gait normal       Deep Tendon Reflexes: Reflexes normal    Psychiatric:         Mood and Affect: Mood normal          Vital Signs  ED Triage Vitals [07/13/21 2359]   Temperature Pulse Respirations Blood Pressure SpO2   98 2 °F (36 8 °C) 66 18 139/88 99 %      Temp Source Heart Rate Source Patient Position - Orthostatic VS BP Location FiO2 (%)   Oral Monitor Lying Right arm --      Pain Score       4           Vitals:    07/13/21 2359 07/14/21 0130   BP: 139/88 91/59   Pulse: 66 78   Patient Position - Orthostatic VS: Lying Lying         Visual Acuity  Visual Acuity      Most Recent Value   L Pupil Size (mm)  3   R Pupil Size (mm)  3          ED Medications  Medications   ondansetron (ZOFRAN-ODT) dispersible tablet 4 mg (4 mg Oral Given 7/14/21 0042)   ibuprofen (MOTRIN) tablet 400 mg (400 mg Oral Given 7/14/21 0042)       Diagnostic Studies  Results Reviewed     Procedure Component Value Units Date/Time    POCT pregnancy, urine [[id number]]  (Normal) Resulted: 07/14/21 0114    Lab Status: Final result Updated: 07/14/21 0114     EXT PREG TEST UR (Ref: Negative) negative     Control valid                 CT head without contrast   Final Result by Janet Swain DO (07/14 0134)      No acute intracranial abnormality is seen  Workstation performed: [id number]                    Procedures  Procedures         ED Course  ED Course as of Jul 14 0149 Wed Jul 14, 2021   1020 Patient had normal creatinine with CMP in November of last year  0145 Patient requesting discharge  Again offered continued evaluation, which patient declined  Discussed follow-up and return precautions in detail  SBIRT 22yo+      Most Recent Value   SBIRT (24 yo +)   In order to provide better care to our patients, we are screening all of our patients for alcohol and drug use  Would it be okay to ask you these screening questions? No Filed at: 07/14/2021 0011                    MDM    Disposition  Final diagnoses:   Headache   Hypertension     Time reflects when diagnosis was documented in both MDM as applicable and the Disposition within this note     Time User Action Codes Description Comment    7/14/2021 12:51 AM Melania Gibson Add [R51 9] Headache     7/14/2021 12:51 AM Jose, [address] Hypertension       ED Disposition     ED Disposition Condition Date/Time Comment    Discharge Stable Wed Jul 14, 2021 12:51 AM Nena Montenegro discharge to home/self care  Follow-up Information     Follow up With Specialties Details Why [address], [address] [facility], Nurse Practitioner Schedule an appointment as soon as possible for a visit in 2 days Follow up and reassessment  Recheck blood pressure   Thaddeus Sparrow [facility] Emergency Department Emergency Medicine Go to  If symptoms worsen [address] [facility] [address] [facility] Emergency Department, [address] [facility], [facility], [address] [place] [address], [id number]          Patient's Medications   Discharge Prescriptions    No medications on file     No discharge procedures on file      PDMP Review       Value Time User    PDMP Reviewed  Yes 11/8/2019  7:26 AM Ulisses Caputo MD          ED Provider  Electronically Signed by           Roberto Meza MD  07/14/21 3614

## 2021-07-22 ENCOUNTER — APPOINTMENT (OUTPATIENT)
Dept: RADIOLOGY | Facility: CLINIC | Age: 36
End: 2021-07-22
Payer: COMMERCIAL

## 2021-07-22 ENCOUNTER — APPOINTMENT (OUTPATIENT)
Dept: LAB | Facility: CLINIC | Age: 36
End: 2021-07-22
Payer: COMMERCIAL

## 2021-07-22 DIAGNOSIS — Z11.1 TUBERCULOSIS SCREENING: ICD-10-CM

## 2021-07-22 DIAGNOSIS — Z01.84 IMMUNITY STATUS TESTING: ICD-10-CM

## 2021-07-22 DIAGNOSIS — Z01.84 IMMUNITY STATUS TESTING: Primary | ICD-10-CM

## 2021-07-22 LAB
HBV CORE AB SER QL: NORMAL
HBV SURFACE AB SER-ACNC: 16.55 MIU/ML
HCV AB SER QL: NORMAL
RUBV IGG SERPL IA-ACNC: 135.5 IU/ML

## 2021-07-22 PROCEDURE — 71046 X-RAY EXAM CHEST 2 VIEWS: CPT

## 2021-07-22 PROCEDURE — 36415 COLL VENOUS BLD VENIPUNCTURE: CPT

## 2021-07-22 PROCEDURE — 86735 MUMPS ANTIBODY: CPT

## 2021-07-22 PROCEDURE — 86765 RUBEOLA ANTIBODY: CPT

## 2021-07-22 PROCEDURE — 86762 RUBELLA ANTIBODY: CPT

## 2021-07-22 PROCEDURE — 86803 HEPATITIS C AB TEST: CPT

## 2021-07-22 PROCEDURE — 86704 HEP B CORE ANTIBODY TOTAL: CPT

## 2021-07-22 PROCEDURE — 86706 HEP B SURFACE ANTIBODY: CPT

## 2021-07-28 ENCOUNTER — OFFICE VISIT (OUTPATIENT)
Dept: FAMILY MEDICINE CLINIC | Facility: CLINIC | Age: 36
End: 2021-07-28
Payer: COMMERCIAL

## 2021-07-28 VITALS
HEIGHT: 62 IN | DIASTOLIC BLOOD PRESSURE: 80 MMHG | TEMPERATURE: 99 F | BODY MASS INDEX: 27.05 KG/M2 | WEIGHT: 147 LBS | SYSTOLIC BLOOD PRESSURE: 116 MMHG | HEART RATE: 77 BPM | OXYGEN SATURATION: 99 %

## 2021-07-28 DIAGNOSIS — F32.A FATIGUE DUE TO DEPRESSION: ICD-10-CM

## 2021-07-28 DIAGNOSIS — F33.1 MODERATE EPISODE OF RECURRENT MAJOR DEPRESSIVE DISORDER (HCC): Primary | ICD-10-CM

## 2021-07-28 DIAGNOSIS — R53.83 FATIGUE DUE TO DEPRESSION: ICD-10-CM

## 2021-07-28 DIAGNOSIS — Z63.4 GRIEF AT LOSS OF CHILD: ICD-10-CM

## 2021-07-28 DIAGNOSIS — F43.21 GRIEF AT LOSS OF CHILD: ICD-10-CM

## 2021-07-28 PROCEDURE — 3725F SCREEN DEPRESSION PERFORMED: CPT | Performed by: NURSE PRACTITIONER

## 2021-07-28 PROCEDURE — 99214 OFFICE O/P EST MOD 30 MIN: CPT | Performed by: NURSE PRACTITIONER

## 2021-07-28 PROCEDURE — 3008F BODY MASS INDEX DOCD: CPT | Performed by: NURSE PRACTITIONER

## 2021-07-28 SDOH — SOCIAL STABILITY - SOCIAL INSECURITY: DISSAPEARANCE AND DEATH OF FAMILY MEMBER: Z63.4

## 2021-07-28 NOTE — PROGRESS NOTES
Depression Screening Follow-up Plan: Patient's depression screening was positive with a PHQ-2 score of 5  Their PHQ-9 score was 11  Patient assessed for underlying major depression  They have no active suicidal ideations  Brief counseling provided and recommend additional follow-up/re-evaluation next office visit  OFFICE VISIT  Elvin Gauthier 39 y o  female MRN: [id number]      Depression Screening and Follow-up Plan: Patient's depression screening was positive with a PHQ-2 score of 5  Their PHQ-9 score was 11  Patient assessed for underlying major depression  Brief counseling provided and recommend additional follow-up/re-evaluation next office visit  Assessment / Plan:  Problem List Items Addressed This Visit        Other    Moderate episode of recurrent major depressive disorder ([facility] [address] ) - Primary    Relevant Orders    Ambulatory referral to Psychiatry    CBC and differential    TSH, 3rd generation with Free T4 reflex    Grief at loss of child     patient would benefit from counseling services, emotioal support provided, referral given  Other Visit Diagnoses     Fatigue due to depression        Relevant Orders    CBC and differential    TSH, 3rd generation with Free T4 reflex    Comprehensive metabolic panel    Vitamin D 25 hydroxy    Vitamin B12            Reason For Visit / Chief Complaint  Chief Complaint   Patient presents with    Follow-up        HPI:  Elvin Gauthier is a 39 y o  female who presents today for depression, she reports a hx of depression has been on  medication in the past, she reports increase fatigued, she will be joining the gym  She wants to hold off on Medication  She reports poor sleeping,  Poor appetite  No motivation Moods stable, sad more than happy, a lot of stress  boyfriend cheated, recent completion of rn school, hesitant to study for [employer], newly applied job position, has two children, has lost a child         Historical Information   Past Medical History: Diagnosis Date    Hypertension     with pregnancy    Pre-eclampsia      Past Surgical History:   Procedure Laterality Date    KNEE ARTHROSCOPY       Social History   Social History     Substance and Sexual Activity   Alcohol Use Not Currently    Comment: socially     Social History     Substance and Sexual Activity   Drug Use Never     Social History     Tobacco Use   Smoking Status Never Smoker   Smokeless Tobacco Never Used     Family History   Problem Relation Age of Onset    Asthma Mother     Anemia Mother     Arthritis Mother        Meds/Allergies   Allergies   Allergen Reactions    Pepcid [Famotidine]     Shellfish-Derived Products - Food Allergy        Meds:    Current Outpatient Medications:     triamcinolone (KENALOG) 0 5 % cream, Apply topically 2 (two) times a day, Disp: 30 g, Rfl: 3      REVIEW OF SYSTEMS  Review of Systems   Constitutional: Positive for activity change, appetite change and fatigue  Negative for chills and fever  HENT: Negative for congestion, ear discharge, ear pain, sinus pressure, sinus pain, sore throat, tinnitus and trouble swallowing  Eyes: Negative for photophobia, pain, discharge, itching and visual disturbance  Respiratory: Negative for cough, chest tightness, shortness of breath and wheezing  Cardiovascular: Negative for chest pain and leg swelling  Gastrointestinal: Negative for abdominal distention, abdominal pain, constipation, diarrhea, nausea and vomiting  Endocrine: Negative for polydipsia, polyphagia and polyuria  Genitourinary: Negative for dysuria and frequency  Musculoskeletal: Negative for arthralgias, myalgias, neck pain and neck stiffness  Skin: Negative for color change  Neurological: Negative for dizziness, syncope, weakness, numbness and headaches  Hematological: Does not bruise/bleed easily  Psychiatric/Behavioral: Positive for sleep disturbance  Negative for behavioral problems, confusion, self-injury and suicidal ideas  The patient is not nervous/anxious  Current Vitals:   Blood Pressure: 116/80 (07/28/21 1308)  Pulse: 77 (07/28/21 1308)  Temperature: 99 °F (37 2 °C) (07/28/21 1308)  Height: 5' 2" (157 5 cm) (07/28/21 1308)  Weight - Scale: 66 7 kg (147 lb) (07/28/21 1308)  SpO2: 99 % (07/28/21 1308)  [unfilled]    PHYSICAL EXAMS:  Physical Exam  Constitutional:       Appearance: Normal appearance  She is well-developed  HENT:      Head: Normocephalic  Right Ear: Tympanic membrane, ear canal and external ear normal       Left Ear: Tympanic membrane, ear canal and external ear normal       Nose: Nose normal  No congestion or rhinorrhea  Mouth/Throat:      Mouth: Mucous membranes are moist       Pharynx: No oropharyngeal exudate or posterior oropharyngeal erythema  Eyes:      Extraocular Movements: Extraocular movements intact  Conjunctiva/sclera: Conjunctivae normal       Pupils: Pupils are equal, round, and reactive to light  Cardiovascular:      Rate and Rhythm: Normal rate and regular rhythm  Pulses: Normal pulses  Heart sounds: Normal heart sounds  Pulmonary:      Effort: Pulmonary effort is normal       Breath sounds: Normal breath sounds  No wheezing or rhonchi  Abdominal:      General: Bowel sounds are normal  There is no distension  Palpations: Abdomen is soft  Tenderness: There is no abdominal tenderness  Musculoskeletal:         General: No swelling, tenderness, deformity or signs of injury  Normal range of motion  Cervical back: Normal range of motion and neck supple  Right lower leg: No edema  Left lower leg: No edema  Skin:     General: Skin is warm and dry  Findings: No bruising, erythema, lesion or rash  Neurological:      General: No focal deficit present  Mental Status: She is alert and oriented to person, place, and time  Psychiatric:         Mood and Affect: Mood normal  Affect is tearful           Behavior: Behavior normal  Thought Content: Thought content normal          Judgment: Judgment normal              Lab, imaging and other studies: I have personally reviewed pertinent reports  Daxa Lee

## 2021-07-29 LAB
MEV IGG SER QL: NORMAL
MUV IGG SER QL: NORMAL

## 2021-08-04 ENCOUNTER — HOSPITAL ENCOUNTER (EMERGENCY)
Facility: HOSPITAL | Age: 36
End: 2021-08-05
Attending: EMERGENCY MEDICINE | Admitting: EMERGENCY MEDICINE
Payer: COMMERCIAL

## 2021-08-04 DIAGNOSIS — R45.851 SUICIDAL IDEATIONS: ICD-10-CM

## 2021-08-04 DIAGNOSIS — F32.A DEPRESSION: Primary | ICD-10-CM

## 2021-08-04 LAB
AMPHETAMINES SERPL QL SCN: NEGATIVE
BARBITURATES UR QL: NEGATIVE
BENZODIAZ UR QL: NEGATIVE
COCAINE UR QL: NEGATIVE
ETHANOL EXG-MCNC: 0 MG/DL
EXT PREG TEST URINE: NEGATIVE
EXT. CONTROL ED NAV: NORMAL
METHADONE UR QL: NEGATIVE
OPIATES UR QL SCN: NEGATIVE
OXYCODONE+OXYMORPHONE UR QL SCN: NEGATIVE
PCP UR QL: NEGATIVE
SARS-COV-2 RNA RESP QL NAA+PROBE: NEGATIVE
THC UR QL: NEGATIVE

## 2021-08-04 PROCEDURE — U0005 INFEC AGEN DETEC AMPLI PROBE: HCPCS | Performed by: EMERGENCY MEDICINE

## 2021-08-04 PROCEDURE — 82075 ASSAY OF BREATH ETHANOL: CPT | Performed by: EMERGENCY MEDICINE

## 2021-08-04 PROCEDURE — 81025 URINE PREGNANCY TEST: CPT | Performed by: EMERGENCY MEDICINE

## 2021-08-04 PROCEDURE — 99285 EMERGENCY DEPT VISIT HI MDM: CPT

## 2021-08-04 PROCEDURE — 99285 EMERGENCY DEPT VISIT HI MDM: CPT | Performed by: EMERGENCY MEDICINE

## 2021-08-04 PROCEDURE — U0003 INFECTIOUS AGENT DETECTION BY NUCLEIC ACID (DNA OR RNA); SEVERE ACUTE RESPIRATORY SYNDROME CORONAVIRUS 2 (SARS-COV-2) (CORONAVIRUS DISEASE [COVID-19]), AMPLIFIED PROBE TECHNIQUE, MAKING USE OF HIGH THROUGHPUT TECHNOLOGIES AS DESCRIBED BY CMS-2020-01-R: HCPCS | Performed by: EMERGENCY MEDICINE

## 2021-08-04 PROCEDURE — 80307 DRUG TEST PRSMV CHEM ANLYZR: CPT | Performed by: EMERGENCY MEDICINE

## 2021-08-04 RX ORDER — OLANZAPINE 2.5 MG/1
2.5 TABLET ORAL
Status: CANCELLED | OUTPATIENT
Start: 2021-08-04

## 2021-08-04 RX ORDER — ACETAMINOPHEN 325 MG/1
975 TABLET ORAL ONCE
Status: COMPLETED | OUTPATIENT
Start: 2021-08-04 | End: 2021-08-04

## 2021-08-04 RX ORDER — OLANZAPINE 2.5 MG/1
5 TABLET ORAL
Status: CANCELLED | OUTPATIENT
Start: 2021-08-04

## 2021-08-04 RX ORDER — OLANZAPINE 10 MG/1
2.5 INJECTION, POWDER, LYOPHILIZED, FOR SOLUTION INTRAMUSCULAR
Status: CANCELLED | OUTPATIENT
Start: 2021-08-04

## 2021-08-04 RX ORDER — POLYETHYLENE GLYCOL 3350 17 G/17G
17 POWDER, FOR SOLUTION ORAL DAILY PRN
Status: CANCELLED | OUTPATIENT
Start: 2021-08-04

## 2021-08-04 RX ORDER — LANOLIN ALCOHOL/MO/W.PET/CERES
3 CREAM (GRAM) TOPICAL
Status: CANCELLED | OUTPATIENT
Start: 2021-08-04

## 2021-08-04 RX ORDER — OLANZAPINE 10 MG/1
5 INJECTION, POWDER, LYOPHILIZED, FOR SOLUTION INTRAMUSCULAR
Status: CANCELLED | OUTPATIENT
Start: 2021-08-04

## 2021-08-04 RX ADMIN — ACETAMINOPHEN 975 MG: 325 TABLET, FILM COATED ORAL at 14:16

## 2021-08-04 NOTE — ED NOTES
Insurance Authorization for admission:   Phone call placed to [facility]  Phone number: [phone]  Spoke to [place]  14 days approved  Level of care: 201  Review on 8/17/21  Authorization # [id number]  EVS (Eligibility Verification System) called - [phone]  Automated system indicates: MA eligible  Insurance Authorization for Transportation:      No transport auth needed for CTS      Mayte Rubio, [facility], [address]  08/04/21 19:17

## 2021-08-04 NOTE — ED NOTES
Pt presents to the ED due to increased depression and suicidal ideations with a plan to crash her vehicle into a tree or to OD  Pt admits to one previous suicide attempt in 2016 after the passing of her 9 yr old son, via jumping out of a moving vehicle  Pt reports that her son was born with a congenital heart defect and had 3 open heart surgeries, in addition to numerous other cardiac procedures  Pt notes that her son was spending the weekend with his father at the time, and wasn't feeling well  Father brought the son to hospital and alerted pt  Pt notes, by the time she arrived at hospital her son had already passed  Pt reports she attempted to get therapy at the [facility], but it was not helpful  Her doctor placed her on a short-term course of Xanax and a sleep aid  Currently pt has no out-pt  Tx services  Pt notes she recently found out that her boyfriend, with whom she shares 2 young daughters, has been having an affair with someone at their place of work, as both pt and boyfriend work at the same place  Pt states she is now trying to find new employment so she can save enough money to move out with her 2 daughters  Pt denies any homicidal ideations, nor any auditory or visual hallucinations at this time  Pt denies any D & A problems, nor any legal issues  Pt denies any Hx of abuse or neglect  Pt reports poor sleep and poor appetite, with a recent weight loss of 15 lbs  CW discussed Tx options with pt, who is agreeable to signing a 201  CW discussed this case with Dr Jaclyn Cat who is in agreement with this Tx plan      Ruby Carpenter, NARGIS  08/04/21 17:38

## 2021-08-04 NOTE — ED NOTES
Patient food tray arrived  Patient is calm and cooperative at this time        Nuris Baugh  08/04/21 4876

## 2021-08-04 NOTE — ED NOTES
Patient requesting boyfriend to come back to visit when coming to  her keys for her car from staff  Patient states understanding that visitor will be asked to leave if there is any confrontations that occur  Patient's visitor will also have to have phone and belongings placed in locker before entering patient's room  Patient states understanding at this time       Ibeth Cabrera RN  08/04/21 5754

## 2021-08-04 NOTE — LETTER
[address]  [address]  [facility] [address]  Dept: [phone]           EMTALA TRANSFER CONSENT    NAME Natalya Harrell                                 1985                              MRN [id number]    I have been informed of my rights regarding examination, treatment, and transfer   by Dr Jael Conroy, *    Benefits: Continuity of care    Risks: Potential for delay in receiving treatment      Consent for Transfer:  I acknowledge that my medical condition has been evaluated and explained to me by the emergency department physician or other qualified medical person and/or my attending physician, who has recommended that I be transferred to the service of  Accepting Physician: Dr Kayleigh Bond at [address], [address] : [id number]  The above potential benefits of such transfer, the potential risks associated with such transfer, and the probable risks of not being transferred have been explained to me, and I fully understand them  The doctor has explained that, in my case, the benefits of transfer outweigh the risks  I agree to be transferred  I authorize the performance of emergency medical procedures and treatments upon me in both transit and upon arrival at the receiving facility  Additionally, I authorize the release of any and all medical records to the receiving facility and request they be transported with me, if possible  I understand that the safest mode of transportation during a medical emergency is an ambulance and that the Hospital advocates the use of this mode of transport  Risks of traveling to the receiving facility by car, including absence of medical control, life sustaining equipment, such as oxygen, and medical personnel has been explained to me and I fully understand them  (PAMELA CORRECT BOX BELOW)  Stephanie Pack  ]  I consent to the stated transfer and to be transported by ambulance/helicopter    [  ]  I consent to the stated transfer, but refuse transportation by ambulance and accept full responsibility for my transportation by car  I understand the risks of non-ambulance transfers and I exonerate the Hospital and its staff from any deterioration in my condition that results from this refusal     X___________________________________________    DATE  21  TIME__23:36______  Signature of patient or legally responsible individual signing on patient behalf           RELATIONSHIP TO PATIENT____Self_____________                              Provider Certification    NAME Ellis Salcido                                 1985                              MRN [id number]    A medical screening exam was performed on the above named patient  Based on the examination:    Condition Necessitating Transfer The primary encounter diagnosis was Depression  A diagnosis of Suicidal ideations was also pertinent to this visit  Patient Condition: The patient has been stabilized such that within reasonable medical probability, no material deterioration of the patient condition or the condition of the unborn child(jennifer) is likely to result from the transfer    Reason for Transfer: Level of Care needed not available at this facility    Transfer Requirements: Facility [facility]   · Space available and qualified personnel available for treatment as acknowledged by BETZY [place] @ [phone]  · Agreed to accept transfer and to provide appropriate medical treatment as acknowledged by       Dr Niya Stearns  · Appropriate medical records of the examination and treatment of the patient are provided at the time of transfer   [address] [facility] __A  A _____  · Transfer will be performed by qualified personnel from [address] [place] [address]  and appropriate transfer equipment as required, including the use of necessary and appropriate life support measures      Provider Certification: I have examined the patient and explained the following risks and benefits of being transferred/refusing transfer to the patient/family:  General risk, such as traffic hazards, adverse weather conditions, rough terrain or turbulence, possible failure of equipment (including vehicle or aircraft), or consequences of actions of persons outside the control of the transport personnel      Based on these reasonable risks and benefits to the patient and/or the unborn child(jennifer), and based upon the information available at the time of the patients examination, I certify that the medical benefits reasonably to be expected from the provision of appropriate medical treatments at another medical facility outweigh the increasing risks, if any, to the individuals medical condition, and in the case of labor to the unborn child, from effecting the transfer      X____________________________________________ DATE 08/04/21        TIME_______      ORIGINAL - SEND TO MEDICAL RECORDS   COPY - SEND WITH PATIENT DURING TRANSFER

## 2021-08-04 NOTE — ED NOTES
Pt  Amadou Zayas called   Phone given to patient so she can speak with him     Ting Paul RN  08/04/21 1733

## 2021-08-04 NOTE — ED PROVIDER NOTES
History  Chief Complaint   Patient presents with    Psychiatric Evaluation     pt crying in triage, states she "just cant stop crying, I dont want to be here anymore " Pt reports SI with plan to crash her car while driving  Pt reports no outpatient resources  History provided by:  Patient   used: No    Psychiatric Evaluation  Presenting symptoms: depression and suicidal thoughts    Patient accompanied by: self  Degree of incapacity (severity):  Severe  Onset quality:  Gradual  Timing:  Constant  Progression:  Worsening  Chronicity:  New  Treatment compliance:  Untreated  Relieved by:  Nothing  Worsened by:  Nothing  Ineffective treatments:  None tried  Associated symptoms: appetite change and fatigue    Associated symptoms: no abdominal pain and no chest pain        Prior to Admission Medications   Prescriptions Last Dose Informant Patient Reported? Taking?   triamcinolone (KENALOG) 0 5 % cream  Self No No   Sig: Apply topically 2 (two) times a day      Facility-Administered Medications: None       Past Medical History:   Diagnosis Date    Depression     Hypertension     with pregnancy    Pre-eclampsia        Past Surgical History:   Procedure Laterality Date    KNEE ARTHROSCOPY         Family History   Problem Relation Age of Onset    Asthma Mother     Anemia Mother     Arthritis Mother      I have reviewed and agree with the history as documented  E-Cigarette/Vaping    E-Cigarette Use Never User      E-Cigarette/Vaping Substances    Nicotine No     THC No     CBD No     Flavoring No     Other No     Unknown No      Social History     Tobacco Use    Smoking status: Never Smoker    Smokeless tobacco: Never Used   Vaping Use    Vaping Use: Never used   Substance Use Topics    Alcohol use: Not Currently     Comment: socially    Drug use: Never       Review of Systems   Constitutional: Positive for activity change, appetite change and fatigue   Negative for chills and fever    HENT: Negative for ear pain and sore throat  Eyes: Negative for pain and visual disturbance  Respiratory: Negative for cough and shortness of breath  Cardiovascular: Negative for chest pain and palpitations  Gastrointestinal: Negative for abdominal pain and vomiting  Genitourinary: Negative for dysuria and hematuria  Musculoskeletal: Negative for arthralgias and back pain  Skin: Negative for color change and rash  Neurological: Negative for seizures and syncope  Psychiatric/Behavioral: Positive for decreased concentration, dysphoric mood, sleep disturbance and suicidal ideas  All other systems reviewed and are negative  Physical Exam  Physical Exam  Vitals and nursing note reviewed  Constitutional:       General: She is not in acute distress  Appearance: She is well-developed  HENT:      Head: Normocephalic and atraumatic  Eyes:      Conjunctiva/sclera: Conjunctivae normal    Cardiovascular:      Rate and Rhythm: Normal rate and regular rhythm  Heart sounds: No murmur heard  Pulmonary:      Effort: Pulmonary effort is normal  No respiratory distress  Breath sounds: Normal breath sounds  Abdominal:      Palpations: Abdomen is soft  Tenderness: There is no abdominal tenderness  Musculoskeletal:      Cervical back: Neck supple  Skin:     General: Skin is warm and dry  Capillary Refill: Capillary refill takes less than 2 seconds  Neurological:      General: No focal deficit present  Mental Status: She is alert and oriented to person, place, and time     Psychiatric:      Comments: Intermittently tearful, admits to SI         Vital Signs  ED Triage Vitals [08/04/21 1251]   Temperature Pulse Respirations Blood Pressure SpO2   98 8 °F (37 1 °C) 75 18 148/100 100 %      Temp Source Heart Rate Source Patient Position - Orthostatic VS BP Location FiO2 (%)   Oral Monitor Sitting Left arm --      Pain Score       --           Vitals:    08/04/21 1251   BP: 148/100   Pulse: 75   Patient Position - Orthostatic VS: Sitting         Visual Acuity      ED Medications  Medications   acetaminophen (TYLENOL) tablet 975 mg (975 mg Oral Given 8/4/21 1416)       Diagnostic Studies  Results Reviewed     Procedure Component Value Units Date/Time    POCT alcohol breath test [[id number]]  (Normal) Resulted: 08/04/21 1712    Lab Status: Final result Updated: 08/04/21 1712     EXTBreath Alcohol 0 000    Rapid drug screen, urine [[id number]]  (Normal) Collected: 08/04/21 1602    Lab Status: Final result Specimen: Urine, Clean Catch Updated: 08/04/21 1618     Amph/Meth UR Negative     Barbiturate Ur Negative     Benzodiazepine Urine Negative     Cocaine Urine Negative     Methadone Urine Negative     Opiate Urine Negative     PCP Ur Negative     THC Urine Negative     Oxycodone Urine Negative    Narrative:      FOR MEDICAL PURPOSES ONLY  IF CONFIRMATION NEEDED PLEASE CONTACT THE LAB WITHIN 5 DAYS  Drug Screen Cutoff Levels:  AMPHETAMINE/METHAMPHETAMINES  1000 ng/mL  BARBITURATES     200 ng/mL  BENZODIAZEPINES     200 ng/mL  COCAINE      300 ng/mL  METHADONE      300 ng/mL  OPIATES      300 ng/mL  PHENCYCLIDINE     25 ng/mL  THC       50 ng/mL  OXYCODONE      100 ng/mL    POCT pregnancy, urine [[id number]]  (Normal) Resulted: 08/04/21 1601    Lab Status: Final result Updated: 08/04/21 1601     EXT PREG TEST UR (Ref: Negative) Negative     Control Valid    Novel Coronavirus (Covid-19),PCR [facility] - 2 Hour Stat [[id number]]  (Normal) Collected: 08/04/21 1416    Lab Status: Final result Specimen: Nares from Nose Updated: 08/04/21 1517     SARS-CoV-2 Negative    Narrative: The specimen collection materials, transport medium, and/or testing methodology utilized in the production of these test results have been proven to be reliable in a limited validation with an abbreviated program under the Emergency Utilization Authorization provided by the FDA    Testing reported as "Presumptive positive" will be confirmed with secondary testing to ensure result accuracy  Clinical caution and judgement should be used with the interpretation of these results with consideration of the clinical impression and other laboratory testing  Testing reported as "Positive" or "Negative" has been proven to be accurate according to standard laboratory validation requirements  All testing is performed with control materials showing appropriate reactivity at standard intervals  No orders to display              Procedures  Procedures         ED Course                                           MDM  Number of Diagnoses or Management Options  Depression: new and requires workup  Suicidal ideations: new and requires workup  Diagnosis management comments: 40-year-old female presented to the emergency department with worsening depression and thoughts of suicide  She states that she unfortunately had one of her children die at the age of [de-identified] a few years ago  At that point she she had been diagnosed with grief reaction but did not receive any outpatient treatment  Over the past days and weeks she has had worsening depression, possibly stemming from knowledge that her partner participated in an affair with one of her coworkers  She has had increasing thoughts of suicide, specifically to drive her car into a tree  She signed a 201 voluntary inpatient commitment form and will remain in our emergency department until she can be placed at an appropriate inpatient psychiatric facility         Amount and/or Complexity of Data Reviewed  Clinical lab tests: ordered and reviewed  Review and summarize past medical records: yes  Discuss the patient with other providers: yes        Disposition  Final diagnoses:   Depression   Suicidal ideations     Time reflects when diagnosis was documented in both MDM as applicable and the Disposition within this note     Time User Action Codes Description Comment 8/4/2021  2:58 PM Ana María Li Add [F32 9] Depression     8/4/2021  6:14 PM Ana María Li Add [Q05 152] Suicidal ideations       ED Disposition     ED Disposition Condition Date/Time Comment    [facility] 2021  2:58 PM Patria Sullivan should be transferred out to [facility] and has been medically cleared  MD Documentation      Most Recent Value   Sending MD  Dr Saira Rucker    None         Patient's Medications   Discharge Prescriptions    No medications on file     No discharge procedures on file      PDMP Review       Value Time User    PDMP Reviewed  Yes 11/8/2019  7:26 AM Myrna Hughes MD          ED Provider  Electronically Signed by           Chiquis Blair MD  08/04/21 5646

## 2021-08-04 NOTE — ED NOTES
Both pt and Dr Jay Katz signed the 201 document  As per [place] I of Intake, there are beds @ [facility]; CW faxed chart for review       Ruby Back CW  08/04/21 18:13

## 2021-08-04 NOTE — ED NOTES
[facility] is [facility]  Patient is accepted by Dr Cherri Mccollum per Bryon Andre  Transportation is arranged with [facility] via [facility]  Transportation is scheduled for 10:00 am    Patient may go to the floor after 00:00       CW informed [place] of Intake of pt's ETA  Nurse report is to be called to [phone] prior to patient transfer      [facility]fer [facility], [address]  08/04/21 19:49

## 2021-08-04 NOTE — ED NOTES
CW spoke with [employer] [facility] of [address] [place] [address] to log call for pre-cert      Ruby Raphael CW  08/04/21 18:16

## 2021-08-05 ENCOUNTER — HOSPITAL ENCOUNTER (INPATIENT)
Facility: HOSPITAL | Age: 36
LOS: 1 days | Discharge: HOME | DRG: 751 | End: 2021-08-06
Attending: STUDENT IN AN ORGANIZED HEALTH CARE EDUCATION/TRAINING PROGRAM | Admitting: PSYCHIATRY & NEUROLOGY
Payer: COMMERCIAL

## 2021-08-05 VITALS
HEART RATE: 68 BPM | OXYGEN SATURATION: 99 % | RESPIRATION RATE: 16 BRPM | SYSTOLIC BLOOD PRESSURE: 113 MMHG | BODY MASS INDEX: 26.5 KG/M2 | TEMPERATURE: 98 F | DIASTOLIC BLOOD PRESSURE: 69 MMHG | WEIGHT: 144 LBS | HEIGHT: 62 IN

## 2021-08-05 DIAGNOSIS — F43.21 ADJUSTMENT DISORDER WITH DEPRESSED MOOD: ICD-10-CM

## 2021-08-05 DIAGNOSIS — F33.1 MODERATE EPISODE OF RECURRENT MAJOR DEPRESSIVE DISORDER (HCC): Primary | ICD-10-CM

## 2021-08-05 DIAGNOSIS — F32.A DEPRESSION: ICD-10-CM

## 2021-08-05 PROBLEM — F41.9 ANXIETY: Status: ACTIVE | Noted: 2021-08-05

## 2021-08-05 PROCEDURE — 99222 1ST HOSP IP/OBS MODERATE 55: CPT | Performed by: STUDENT IN AN ORGANIZED HEALTH CARE EDUCATION/TRAINING PROGRAM

## 2021-08-05 RX ORDER — TRAZODONE HYDROCHLORIDE 50 MG/1
50 TABLET ORAL
Status: DISCONTINUED | OUTPATIENT
Start: 2021-08-05 | End: 2021-08-06 | Stop reason: HOSPADM

## 2021-08-05 RX ORDER — OLANZAPINE 2.5 MG/1
2.5 TABLET ORAL
Status: DISCONTINUED | OUTPATIENT
Start: 2021-08-05 | End: 2021-08-06 | Stop reason: HOSPADM

## 2021-08-05 RX ORDER — HYDROXYZINE HYDROCHLORIDE 25 MG/1
25 TABLET, FILM COATED ORAL EVERY 6 HOURS PRN
Status: DISCONTINUED | OUTPATIENT
Start: 2021-08-05 | End: 2021-08-06 | Stop reason: HOSPADM

## 2021-08-05 RX ORDER — OLANZAPINE 10 MG/1
2.5 INJECTION, POWDER, LYOPHILIZED, FOR SOLUTION INTRAMUSCULAR
Status: DISCONTINUED | OUTPATIENT
Start: 2021-08-05 | End: 2021-08-06 | Stop reason: HOSPADM

## 2021-08-05 RX ORDER — MINERAL OIL AND PETROLATUM 150; 830 MG/G; MG/G
OINTMENT OPHTHALMIC
Status: DISCONTINUED | OUTPATIENT
Start: 2021-08-05 | End: 2021-08-06 | Stop reason: HOSPADM

## 2021-08-05 RX ORDER — LANOLIN ALCOHOL/MO/W.PET/CERES
3 CREAM (GRAM) TOPICAL
Status: DISCONTINUED | OUTPATIENT
Start: 2021-08-05 | End: 2021-08-05

## 2021-08-05 RX ORDER — OLANZAPINE 5 MG/1
5 TABLET ORAL
Status: DISCONTINUED | OUTPATIENT
Start: 2021-08-05 | End: 2021-08-06 | Stop reason: HOSPADM

## 2021-08-05 RX ORDER — ACETAMINOPHEN 325 MG/1
975 TABLET ORAL EVERY 8 HOURS PRN
Status: DISCONTINUED | OUTPATIENT
Start: 2021-08-05 | End: 2021-08-06 | Stop reason: HOSPADM

## 2021-08-05 RX ORDER — MAGNESIUM HYDROXIDE/ALUMINUM HYDROXICE/SIMETHICONE 120; 1200; 1200 MG/30ML; MG/30ML; MG/30ML
30 SUSPENSION ORAL EVERY 4 HOURS PRN
Status: DISCONTINUED | OUTPATIENT
Start: 2021-08-05 | End: 2021-08-06 | Stop reason: HOSPADM

## 2021-08-05 RX ORDER — HYDROXYZINE 50 MG/1
100 TABLET, FILM COATED ORAL EVERY 12 HOURS PRN
Status: DISCONTINUED | OUTPATIENT
Start: 2021-08-05 | End: 2021-08-06 | Stop reason: HOSPADM

## 2021-08-05 RX ORDER — ACETAMINOPHEN 325 MG/1
650 TABLET ORAL EVERY 6 HOURS PRN
Status: DISCONTINUED | OUTPATIENT
Start: 2021-08-05 | End: 2021-08-06 | Stop reason: HOSPADM

## 2021-08-05 RX ORDER — POLYETHYLENE GLYCOL 3350 17 G/17G
17 POWDER, FOR SOLUTION ORAL DAILY PRN
Status: DISCONTINUED | OUTPATIENT
Start: 2021-08-05 | End: 2021-08-06 | Stop reason: HOSPADM

## 2021-08-05 RX ORDER — ACETAMINOPHEN 325 MG/1
325 TABLET ORAL EVERY 4 HOURS PRN
Status: DISCONTINUED | OUTPATIENT
Start: 2021-08-05 | End: 2021-08-06 | Stop reason: HOSPADM

## 2021-08-05 RX ORDER — LANOLIN ALCOHOL/MO/W.PET/CERES
6 CREAM (GRAM) TOPICAL
Status: DISCONTINUED | OUTPATIENT
Start: 2021-08-05 | End: 2021-08-06 | Stop reason: HOSPADM

## 2021-08-05 RX ORDER — OLANZAPINE 10 MG/1
5 INJECTION, POWDER, LYOPHILIZED, FOR SOLUTION INTRAMUSCULAR
Status: DISCONTINUED | OUTPATIENT
Start: 2021-08-05 | End: 2021-08-06 | Stop reason: HOSPADM

## 2021-08-05 RX ORDER — HYDROXYZINE 50 MG/1
50 TABLET, FILM COATED ORAL EVERY 6 HOURS PRN
Status: DISCONTINUED | OUTPATIENT
Start: 2021-08-05 | End: 2021-08-06 | Stop reason: HOSPADM

## 2021-08-05 RX ADMIN — MELATONIN 6 MG: at 23:15

## 2021-08-05 NOTE — ED NOTES
CW prepared pt's 201 document, face sheet and EMTALA form for transport      Matthieu Larose [facility], [address]  08/04/21 23:43

## 2021-08-05 NOTE — PLAN OF CARE
Problem: SELF HARM/SUICIDALITY  Goal: Will have no self-injury during hospital stay  Description: INTERVENTIONS:  - Q 15 MINUTES: Routine safety checks  - Q WAKING SHIFT & PRN: Assess risk to determine if routine checks are adequate to maintain patient safety  - Encourage patient to participate actively in care by formulating a plan to combat response to suicidal ideation, identify supports and resources  Outcome: Progressing     Problem: DEPRESSION  Goal: Will be euthymic at discharge  Description: INTERVENTIONS:  - Administer medication as ordered  - Provide emotional support via 1:1 interaction with staff  - Encourage involvement in milieu/groups/activities  - Monitor for social isolation  Outcome: Progressing     Problem: ANXIETY  Goal: Will report anxiety at manageable levels  Description: INTERVENTIONS:  - Administer medication as ordered  - Teach and encourage coping skills  - Provide emotional support  - Assess patient/family for anxiety and ability to cope  Outcome: Progressing  Goal: By discharge: Patient will verbalize 2 strategies to deal with anxiety  Description: Interventions:  - Identify any obvious source/trigger to anxiety  - Staff will assist patient in applying identified coping technique/skills  - Encourage attendance of scheduled groups and activities  Outcome: Progressing     Problem: SLEEP DISTURBANCE  Goal: Will exhibit normal sleeping pattern  Description: Interventions:  -  Assess the patients sleep pattern, noting recent changes  - Administer medication as ordered  - Decrease environmental stimuli, including noise, as appropriate during the night  - Encourage the patient to actively participate in unit groups and or exercise during the day to enhance ability to achieve adequate sleep at night  - Assess the patient, in the morning, encouraging a description of sleep experience  Outcome: Progressing

## 2021-08-05 NOTE — CASE MANAGEMENT
Pt is a 44yo single female admitted 8/5/21 1149 as a 201 from [facility]  CM met with pt in order to complete initial intake/assessment and pt presents as cooperative  Pt reports this is her first IP psych admission  Pt reports she resides at [place] [place] [address] with her SO and family and plans to return there upon d/c  Pt reports she will need transport home  Pt denies OP [address] provider but is open to a referral  Pt signed DANIEL for [facility] [phone]  CM contacted pt scheduled for PHONE intake on 8/13 at 11am with Jessy MICHAEL, added to AVS, they requested d/c summary be faxed to [phone], added to comm management  Pt signed DANIEL for PCP [address] [facility] [phone], pt requested f/u be scheduled  CM contacted office notified of admission and scheduled f/u for 8/10 at 12:45pm added to AVS      Pt reports primary support as SO Chaka Ramirez [phone], DANIEL signed  CM contacted provided status update and reviewed d/c plans for tomorrow  CMP MH and CRISIS resources placed in AVS     Pt AUDIT 1, UDS-, PAWSS none listed  Pt denies any current or past D&A issues  Transportation Pt drives    Pharmacy [facility] [place]   Ride home? Y/N with who No, will need transport    Access to firearms Pt denies    Supports SO   Legal Pt denies    Education  RN degree   Employed?  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

## 2021-08-05 NOTE — DISCHARGE SUMMARY
Discharge Summary - [address] 39 y o  female MRN: [id number]  Unit/Bed#: [facility] 253-01 Encounter: [id number]     Admission Date: 8/5/2021         Discharge Date: 8/6/21    Attending Psychiatrist: Irma Stiles*    Reason for Admission/HPI:     Per initial H&P from Dr Maxim Sebastian on 8/5/21:    "Per Crisis worker on 8/4: "Pt presents to the ED due to increased depression and suicidal ideations with a plan to crash her vehicle into a tree or to OD  Pt admits to one previous suicide attempt in 2016 after the passing of her 9 yr old son, via jumping out of a moving vehicle  Pt reports that her son was born with a congenital heart defect and had 3 open heart surgeries, in addition to numerous other cardiac procedures  Pt notes that her son was spending the weekend with his father at the time, and wasn't feeling well  Father brought the son to hospital and alerted pt  Pt notes, by the time she arrived at hospital her son had already passed  Pt reports she attempted to get therapy at the [facility], but it was not helpful  Her doctor placed her on a short-term course of Xanax and a sleep aid  Currently pt has no out-pt  Tx services  Pt notes she recently found out that her boyfriend, with whom she shares 2 young daughters, has been having an affair with someone at their place of work, as both pt and boyfriend work at the same place  Pt states she is now trying to find new employment so she can save enough money to move out with her 2 daughters  Pt denies any homicidal ideations, nor any auditory or visual hallucinations at this time  Pt denies any D & A problems, nor any legal issues  Pt denies any Hx of abuse or neglect  Pt reports poor sleep and poor appetite, with a recent weight loss of 15 lbs  CW discussed Tx options with pt, who is agreeable to signing a 201   CW discussed this case with Dr Arlene Barnes who is in agreement with this Tx plan "     This is 38 yo female with hx of depression/anxiety/grief admitted to inpatient unit on voluntary status for depression and fleeting suicidal ideation in the context of psychosocial stressors  Patient says that she was depressed after the death of her son in 2016  She was able to recover with family support but she  has been going through lot of for the past year  Due to COVID she was staying at home, take care of children and some relationship issues with boy friend  All these thoughts led worsened her anxiety a had thought to to crash the car and brought herself to ER  Patient says that she is not going to act on those thoughts as she has good support and take care of children  She wants out patient psychotherapy at this time, as she wants to talk to somebody  She approached her PCP a week ago, was referred to therapy but did not get appointment yet  She is also trying to resolve the relationship issues and planning to get family therapy  She is also returning to work from next week  Patient appears cooperative and reliable historian "      Social History     Tobacco History     Smoking Status  Current Some Day Smoker    Smokeless Tobacco Use  Never Used    Tobacco Comment  smokes 1 cigarette "socially"           Alcohol History     Alcohol Use Status  Yes Comment  socially          Drug Use     Drug Use Status  Never          Sexual Activity     Sexually Active  Not Asked          Activities of Daily Living    Not Asked               Additional Substance Use Detail     Questions Responses    Problems Due to Past Use of Substances?  No    Substance Use Assessment Substance use within the past 12 months    Alcohol Use Frequency Experimented    Cannabis frequency Never used    Comment: Never used on 8/5/2021     Heroin Frequency Denies use in past 12 months    Cocaine frequency Never used    Comment: Never used on 8/5/2021     Crack Cocaine Frequency Denies use in past 12 months    Methamphetamine Frequency Denies use in past 12 months    Narcotic Frequency Denies use in past 12 months    Benzodiazepine Frequency Denies use in past 12 months    Amphetamine frequency Denies use in past 12 months    Barbituate Frequency Denies use use in past 12 months    Inhalant frequency Never used    Comment: Never used on 8/5/2021     Hallucinogen frequency Never used    Comment: Never used on 8/5/2021     Ecstasy frequency Never used    Comment: Never used on 8/5/2021     Other drug frequency Never used    Comment: Never used on 8/5/2021     Opiate frequency Denies use in past 12 months    Last reviewed by Yolanda Harden RN on 8/5/2021          Past Medical History:   Diagnosis Date    Depression     Hypertension     with pregnancy    Pre-eclampsia      Past Surgical History:   Procedure Laterality Date    KNEE ARTHROSCOPY         Medications: All current active medications have been reviewed  Medications prior to admission:    Prior to Admission Medications   Prescriptions Last Dose Informant Patient Reported? Taking?   triamcinolone (KENALOG) 0 5 % cream Not Taking at Unknown time Self No No   Sig: Apply topically 2 (two) times a day   Patient not taking: Reported on 8/5/2021      Facility-Administered Medications: None       Allergies: Allergies   Allergen Reactions    Pepcid [Famotidine]     Shellfish-Derived Products - Food Allergy        Objective     Vital signs in last 24 hours:    Temp:  [97 9 °F (36 6 °C)-99 1 °F (37 3 °C)] 98 3 °F (36 8 °C)  HR:  [59-73] 73  Resp:  [16-18] 16  BP: (112-125)/(74-86) 125/74    No intake or output data in the 24 hours ending 08/06/21 Birdie [address] Course:     Mauricio Busby was admitted to the inpatient psychiatric unit and started on Behavioral Health checks every 7 minutes  During the hospitalization she was encouraged to attend individual therapy, group therapy, milieu therapy and occupational therapy  Psychiatric medications were offered during the hospital stay   To address depressive symptoms, Olga Lidialine was treated with psychotherapy and therapeutic milieu  She was offered medications but was uninterested despite appropriate education and counseling  Patient signed a 72 hour notice shortly after arriving to the unit  Giveline's symptoms improved over the hospital course  Giveline signed 72 hour notice and requested discharge  At the time of the 72 hour notice expiration she had no criteria for involuntary commitment and denied any suicidal or homicidal ideation  At the time of discharge she was forward thinking, goal oriented and looking forward to returning home  She was able to contract for safety during her brief time on the unit and felt safe to return home as well  The outpatient follow up with Nurse Practitioner Emily Posada at [facility] was arranged by the unit  upon discharge  Mental Status at Time of Discharge:     Appearance:  age appropriate, casually dressed, adequate grooming   Behavior:  cooperative, calm   Speech:  normal rate and volume   Mood:  normal   Affect:  normal range and intensity   Thought Process:  organized   Associations: intact associations   Thought Content:  no overt delusions   Perceptual Disturbances: no auditory hallucinations, no visual hallucinations, does not appear responding to internal stimuli   Risk Potential: Suicidal ideation - None at present, contracts for safety on the unit, would talk to staff if not feeling safe on the unit  Homicidal ideation - None at present  Potential for aggression - No   Sensorium:  oriented to person, place and time/date   Memory:  recent and remote memory grossly intact   Consciousness:  alert and awake   Attention/Concentration: attention span and concentration are age appropriate   Insight:  fair   Judgment: fair   Gait/Station: normal gait/station   Motor Activity: no abnormal movements       Admission Diagnosis:    Principal Problem:     Moderate episode of recurrent major depressive disorder [facility])  Active Problems:    Anxiety    Unresolved grief      Discharge Diagnosis:     Principal Problem: Moderate episode of recurrent major depressive disorder (HCC)  Active Problems:    Anxiety    Unresolved grief  Resolved Problems:    Adjustment disorder with depressed mood      Lab Results:   I have personally reviewed all pertinent laboratory/tests results  Most Recent Labs:   Lab Results   Component Value Date    WBC 10 66 (H) 03/06/2020    RBC 4 21 03/06/2020    HGB 12 7 03/06/2020    HCT 38 2 03/06/2020     03/06/2020    RDW 11 7 03/06/2020    NEUTROABS 7 08 03/06/2020    PREGUR Negative 08/04/2021    PREGSERUM Positive (A) 03/06/2020       Discharge Medications:    See after visit summary for all reconciled discharge medications provided to patient and family  Discharge instructions/Information to patient and family:     See after visit summary for information provided to patient and family  Provisions for Follow-Up Care:    See after visit summary for information related to follow-up care and any pertinent home health orders  Discharge Statement:    I spent 35 minutes discharging the patient  This time was spent on the day of discharge  I had direct contact with the patient on the day of discharge  Additional documentation is required if more than 30 minutes were spent on discharge:    I discussed outpatient follow up with Marsha Long  I discussed medication options, specifically SSRIs, which are standard of care for a diagnosis of Major Depressive Disorder; however, patient remains uninterested in pursuing treatment with psychotropics at this time  I reviewed with Giveline crisis plan and safety plan upon discharge  Giveline signed 72 hour notice and requested discharge  At the time of the 72 hour notice expiration she had no criteria for involuntary commitment and denied any suicidal or homicidal ideation      Discharge on Two Antipsychotic Medications: Ashley Andrew Roland Reed PA-C 08/06/21

## 2021-08-05 NOTE — DISCHARGE INSTR - APPOINTMENTS
Edgar Spangler RN, our [employer], will be calling you after your discharge, on the phone number that you provided  She will be available as an additional support, if needed  If you wish to speak with her, you may contact Lani Kinney at [phone]

## 2021-08-05 NOTE — PROGRESS NOTES
1 pair of sandals  1 glasses  1 bra  1 tank top  1 fleece pull over  1 shorts  1 sweat pants with string  1 pantyhose  1 shower cap  2 breast pumps(1 manual)  1 cell phone  2 photographs

## 2021-08-05 NOTE — ED NOTES
Call to [place], spoke with Delmy Deshpande, to ask if CTS is still on for transport this am     Aisha Pascual, CARLOS  08/05/21  8102

## 2021-08-05 NOTE — CMS CERTIFICATION NOTE
Recertification: Based upon physical, mental and social evaluations, I certify that inpatient psychiatric services continue to be medically necessary for this patient for a duration of 7 midnights for the treatment of  Moderate episode of recurrent major depressive disorder ([facility] [address] )   Available alternative community resources still do not meet the patient's mental health care needs  I further attest that an established written individualized plan of care has been updated and is outlined in the patient's medical records

## 2021-08-05 NOTE — TREATMENT PLAN
TREATMENT PLAN REVIEW - [address] [facility] 39 y o  1985 female MRN: [id number]    [address] [place] [address] Room / Bed: [place] [address]/[facility] 498-13 Encounter: [id number]          Admit Date/Time:  8/5/2021 11:49 AM    Treatment Team: Attending Provider: Nura Trejo MD; Charge Nurse: Belle Canales, RN; Registered Nurse: Libia Ca RN; Patient Care Assistant: Tera Park; Medications RN: Aida Patton RN    Diagnosis: Principal Problem:     Moderate episode of recurrent major depressive disorder (HCC)  Active Problems:    Anxiety    Adjustment disorder with depressed mood    Unresolved grief      Patient Strengths/Assets: cooperative, good insight, motivation for treatment/growth, patient is on a voluntary commitment    Patient Barriers/Limitations: relationship issues    Short Term Goals: decrease in depressive symptoms, decrease in anxiety symptoms, decrease in suicidal thoughts, improvement in insight, improvement in self care, sleep improvement, improvement in appetite    Long Term Goals: improvement in depression, improvement in anxiety, resolution of depressive symptoms, stabilization of mood, free of suicidal thoughts, no self abusive behavior, adequate self care, adequate sleep, adequate appetite    Progress Towards Goals: starting psychiatric medications as prescribed    Recommended Treatment: medication management, patient medication education, group therapy, milieu therapy, continued Behavioral Health psychiatric evaluation/assessment process    Treatment Frequency: daily medication monitoring, group and milieu therapy daily, monitoring through interdisciplinary rounds, monitoring through weekly patient care conferences    Expected Discharge Date:  5-7 days    Discharge Plan: referral for outpatient medication management with a psychiatrist, referral for outpatient psychotherapy    Treatment Plan Created/Updated By: Arline Burgos MD

## 2021-08-05 NOTE — NURSING NOTE
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

## 2021-08-05 NOTE — ED NOTES
Boyfriend on his way to ED to give patient a picture  Will hold transport until he arrives  Jennifer Sanchez indicated that it would take him 15 minutes to get to ED      Stan Ewing LMSW  08/05/21  0159

## 2021-08-05 NOTE — TREATMENT PLAN
Patient oriented to unit  Informed RN will meet with pt twice daily to educate on medications, diagnosis and assess symptoms  04-Nov-2020 10:35

## 2021-08-05 NOTE — PROGRESS NOTES
08/05/21 1400   Activity/Group Checklist   Group Other (Comment)  (Group Art Therapy; Open-studio)   Attendance Attended   Attendance Duration (min) 46-60   Interactions Interacted appropriately   Affect/Mood Appropriate;Calm   Goals Achieved Able to listen to others; Able to engage in interactions; Increased hopefulness  (Engaged with materials;  Full participation)

## 2021-08-05 NOTE — H&P
Psychiatric Evaluation - [address]  39 y o  female MRN: [id number]  Unit/Bed#: [facility] 253-01 Encounter: [id number]    Assessment/Plan   Active Problems: Moderate episode of recurrent major depressive disorder (HCC)    Anxiety    Adjustment disorder with depressed mood    Unresolved grief    Plan:   Psychotherapy  Check admission labs  Collaborate with family for baseline assessment and disposition planning  Case discussed with treatment team     Treatment options and alternatives were reviewed with the patient, who concurs with the above plan  Risks, benefits, and possible side effects of medications were explained to the patient, and she verbalizes understanding       -----------------------------------    Chief Complaint: "I wanted to die"    History of Present Illness     Per Crisis worker on 8/4: "Pt presents to the ED due to increased depression and suicidal ideations with a plan to crash her vehicle into a tree or to OD  Pt admits to one previous suicide attempt in 2016 after the passing of her 9 yr old son, via jumping out of a moving vehicle  Pt reports that her son was born with a congenital heart defect and had 3 open heart surgeries, in addition to numerous other cardiac procedures  Pt notes that her son was spending the weekend with his father at the time, and wasn't feeling well  Father brought the son to hospital and alerted pt  Pt notes, by the time she arrived at hospital her son had already passed  Pt reports she attempted to get therapy at the [facility], but it was not helpful  Her doctor placed her on a short-term course of Xanax and a sleep aid  Currently pt has no out-pt MH Tx services  Pt notes she recently found out that her boyfriend, with whom she shares 2 young daughters, has been having an affair with someone at their place of work, as both pt and boyfriend work at the same place   Pt states she is now trying to find new employment so she can save enough money to move out with her 2 daughters  Pt denies any homicidal ideations, nor any auditory or visual hallucinations at this time  Pt denies any D & A problems, nor any legal issues  Pt denies any Hx of abuse or neglect  Pt reports poor sleep and poor appetite, with a recent weight loss of 15 lbs  CW discussed Tx options with pt, who is agreeable to signing a 201  CW discussed this case with Dr Brown Bang who is in agreement with this Tx plan "    This is 38 yo female with hx of depression/anxiety/grief admitted to inpatient unit on voluntary status for depression and fleeting suicidal ideation in the context of psychosocial stressors  Patient says that she was depressed after the death of her son in 2016  She was able to recover with family support but she  has been going through lot of for the past year  Due to COVID she was staying at home, take care of children and some relationship issues with boy friend  All these thoughts led worsened her anxiety a had thought to to crash the car and brought herself to ER  Patient says that she is not going to act on those thoughts as she has good support and take care of children  She wants out patient psychotherapy at this time, as she wants to talk to somebody  She approached her PCP a week ago, was referred to therapy but did not get appointment yet  She is also trying to resolve the relationship issues and planning to get family therapy  She is also returning to work from next week  Patient appears cooperative and reliable historian  Psychiatric Review Of Systems:  Problems with sleep: yes, decreased  Appetite changes: no  Weight changes: yes, decreased  Low energy/anergy: no  Low interest/pleasure/anhedonia: no  Somatic symptoms: no  Anxiety/panic: no  Khadijah: no  Guilt/hopeless: no  Self injurious behavior/risky behavior: no    Medical Review Of Systems:  Complete review of systems is negative except as noted above      Historical Information     Psychiatric History:   Psychiatric medication trial: Melatonin trazodone  Inpatient hospitalizations: None  Suicide attempts: in 2017 tried to jump out of the car, in the ER for 24 hours  Violent behavior: None  Outpatient treatment: NO    Substance Abuse History:  Social History     Tobacco Use    Smoking status: Never Smoker    Smokeless tobacco: Never Used   Vaping Use    Vaping Use: Never used   Substance Use Topics    Alcohol use: Not Currently     Comment: socially    Drug use: Never      Patient denies use of tobacco, alcohol, or illicit drugs  I have assessed this patient for substance use within the past 12 months  I spent time with Abdelrahman Meredith in counseling and education on risk of substance abuse  I assessed motivation and encouraged her for treatment as appropriate  Family Psychiatric History:   Patient denies any known family history of psychiatric illness, suicide attempt, or substance abuse    Social History:  Education: Nursing  Learning Disabilities: denies  Marital history: co-habitating  Living arrangement: Lives in a home with family  Occupational History: On leave currently, going to return next week  Functioning Relationships: good support system    Other Pertinent History: None      Traumatic History:   Abuse: denies  Other Traumatic Events: denies    Past Medical History:   Past Medical History:   Diagnosis Date    Depression     Hypertension     with pregnancy    Pre-eclampsia         -----------------------------------  Objective    Temp:  [98 °F (36 7 °C)-99 1 °F (37 3 °C)] 99 1 °F (37 3 °C)  HR:  [62-72] 65  Resp:  [16-18] 16  BP: (113-125)/(69-86) 125/86    Mental Status Evaluation:  Appearance:  alert, good eye contact, appears stated age and appropriate grooming and hygiene   Behavior:  calm and cooperative   Speech:  spontaneous, normal rate, normal volume and coherent   Mood:  depressed and anxious   Affect:  appropriate range and anxious   Thought Process:  organized, logical, goal directed   Thought Content: no verbalized delusions or overt paranoia   Perceptual disturbances: no reported hallucinations and does not appear to be responding to internal stimuli at this time   Risk Potential: No active or passive suicidal or homicidal ideation was verbalized during interview   Sensorium: person, place, time/date, situation, day of week, month of year and year   Memory: recent and remote memory grossly intact   Consciousness: alert   Attention: attention span appeared shorter than expected for age   Insight:  Fair   Judgment: Fair   Gait/Station: normal gait/station   Motor Activity: no abnormal movements     Meds/Allergies   Allergies   Allergen Reactions    Pepcid [Famotidine]     Shellfish-Derived Products - Food Allergy      all current active meds have been reviewed    Behavioral Health Medications: all current active meds have been reviewed  Changes as above  Laboratory results:  I have personally reviewed all pertinent laboratory/tests results    Recent Results (from the past 48 hour(s))   Novel Coronavirus (Covid-19),PCR [facility] - 2 Hour Stat    Collection Time: 08/04/21  2:16 PM    Specimen: Nose; Nares   Result Value Ref Range    SARS-CoV-2 Negative Negative   POCT pregnancy, urine    Collection Time: 08/04/21  4:01 PM   Result Value Ref Range    EXT PREG TEST UR (Ref: Negative) Negative     Control Valid    Rapid drug screen, urine    Collection Time: 08/04/21  4:02 PM   Result Value Ref Range    Amph/Meth UR Negative Negative    Barbiturate Ur Negative Negative    Benzodiazepine Urine Negative Negative    Cocaine Urine Negative Negative    Methadone Urine Negative Negative    Opiate Urine Negative Negative    PCP Ur Negative Negative    THC Urine Negative Negative    Oxycodone Urine Negative Negative   POCT alcohol breath test    Collection Time: 08/04/21  5:12 PM   Result Value Ref Range    EXTBreath Alcohol 0 000               -----------------------------------    Risks / Benefits of Treatment: Risks, benefits, and possible side effects of medications explained to patient  The patient verbalizes understanding and agreement for treatment  Counseling / Coordination of Care:     Patient's presentation on admission and proposed treatment plan were discussed with the treatment team   Diagnosis, medication changes and treatment plan were reviewed with the patient  Recent stressors were discussed with the patient  Events leading to admission were reviewed with the patient  Importance of medication and treatment compliance was reviewed with the patient

## 2021-08-05 NOTE — PLAN OF CARE
Patient is a new admit      Problem: SELF HARM/SUICIDALITY  Goal: Will have no self-injury during hospital stay  Description: INTERVENTIONS:  - Q 15 MINUTES: Routine safety checks  - Q WAKING SHIFT & PRN: Assess risk to determine if routine checks are adequate to maintain patient safety  - Encourage patient to participate actively in care by formulating a plan to combat response to suicidal ideation, identify supports and resources  Outcome: Progressing     Problem: DEPRESSION  Goal: Will be euthymic at discharge  Description: INTERVENTIONS:  - Administer medication as ordered  - Provide emotional support via 1:1 interaction with staff  - Encourage involvement in milieu/groups/activities  - Monitor for social isolation  Outcome: Progressing     Problem: ANXIETY  Goal: Will report anxiety at manageable levels  Description: INTERVENTIONS:  - Administer medication as ordered  - Teach and encourage coping skills  - Provide emotional support  - Assess patient/family for anxiety and ability to cope  Outcome: Progressing  Goal: By discharge: Patient will verbalize 2 strategies to deal with anxiety  Description: Interventions:  - Identify any obvious source/trigger to anxiety  - Staff will assist patient in applying identified coping technique/skills  - Encourage attendance of scheduled groups and activities  Outcome: Progressing     Problem: SLEEP DISTURBANCE  Goal: Will exhibit normal sleeping pattern  Description: Interventions:  -  Assess the patients sleep pattern, noting recent changes  - Administer medication as ordered  - Decrease environmental stimuli, including noise, as appropriate during the night  - Encourage the patient to actively participate in unit groups and or exercise during the day to enhance ability to achieve adequate sleep at night  - Assess the patient, in the morning, encouraging a description of sleep experience  Outcome: Progressing

## 2021-08-05 NOTE — DISCHARGE INSTR - OTHER ORDERS
Marco Vikotr, [facility] [phone]    Text CONNECT to 929561 from anywhere in the [place], anytime, about any type of crisis  A live, trained Crisis Counselor receives the text and lets you know that they are here to listen  The volunteer Crisis Counselor will help you move from a hot moment to a cool moment  Warm Line: [phone], [phone], [phone] If it is not quite a crisis, but you want to talk to someone, 24 hours/day, 7 days/week: Someone to listen; someone who cares  Stephen Mcdonald 11   Address: [address] [place] [address],[address], [facility], [facility] Runicolette Kraft Phone: [phone]    The [facility] on [address] [facility] ([facility]) offers various education & support groups for you & your family  For more information visit their website at [email or web address]/     Dial 2-1-1 to get connected/get help  Free, confidential information & referral available 24/7: Aging Services, Child & Youth Services, Counseling, Education/Training, Food/Shelter/Clothing, Health Services, Parenting, Substance Abuse, Support Groups, Volunteer Opportunities, & much more  Phone: 2-1-1 or 080-376-2544, Web: IHY ZS132SCUI SILAS, Email: Hudson@[employer] (formerly [employer])    The National Suicide Prevention Lifeline, 9-550-673-CNSL (1461), is a federally funded, 24-hour, toll-free suicide prevention service comprised of more than 120 individual crisis centers across the country  This service is available to anyone in suicidal crisis, emotional distress or those concerned about a friend or loved one  Https://suicidepreventionlifeline org/    Find more resources at [facility]    Transportation Assistance   Multi-State Transit Technical Assistance Program (MTAP) help with transportation assistance to appointments   [place] MATP Data:  For questions about the program, please call [address] Data:   To schedule a ride, please call [phone] ext [phone]  If you have questions, please call [address] Data: To schedule a ride, please call [phone]  If you have questions, please call [phone] -We provide non-emergency service throughout the [address] [place] [address], as well as Ryan Delarosa [employer], Jerome Bearded, [address] [facility] and [facility]  To arrange for your non-emergency medical transportation needs, please call us at [phone]

## 2021-08-05 NOTE — BH TRANSITION RECORD
Contact Information: If you have any questions, concerns, pended studies, tests and/or procedures, or emergencies regarding your inpatient behavioral health visit  Please contact [facility] behavioral health unit [phone] and ask to speak to a , nurse or physician  A contact is available 24 hours/ 7 days a week at this number  Summary of Procedures Performed During your Stay:  Below is a list of major procedures performed during your hospital stay and a summary of results:  - No major procedures performed  Pending Studies (From admission, onward)    None        If studies are pending at discharge, follow up with your PCP and/or referring provider

## 2021-08-05 NOTE — NURSING NOTE
Pt remains pleasant and calm  Denies SI/HI-verbally contracts for safety  Pt states "It feels calm and nice here " pt states wanting to get therapy appointments at discharge  Reports family has been supportive  Denies any concerns or questions at this time

## 2021-08-05 NOTE — ED NOTES
Pt uses restroom and is asked to sign form for Crisis  Pt is calm and cooperative at this time       Harry Fagan  08/05/21 0106

## 2021-08-06 VITALS
HEIGHT: 62 IN | OXYGEN SATURATION: 99 % | BODY MASS INDEX: 26.83 KG/M2 | SYSTOLIC BLOOD PRESSURE: 125 MMHG | RESPIRATION RATE: 16 BRPM | HEART RATE: 73 BPM | DIASTOLIC BLOOD PRESSURE: 74 MMHG | WEIGHT: 145.8 LBS | TEMPERATURE: 98.3 F

## 2021-08-06 PROBLEM — Z00.8 MEDICAL CLEARANCE FOR PSYCHIATRIC ADMISSION: Status: ACTIVE | Noted: 2021-08-06

## 2021-08-06 PROCEDURE — 99253 IP/OBS CNSLTJ NEW/EST LOW 45: CPT | Performed by: PHYSICIAN ASSISTANT

## 2021-08-06 PROCEDURE — 99239 HOSP IP/OBS DSCHRG MGMT >30: CPT | Performed by: PHYSICIAN ASSISTANT

## 2021-08-06 NOTE — PROGRESS NOTES
08/06/21 0838   Team Meeting   Meeting Type Tx Team Meeting   Team Members Present   Team Members Present Physician;Nurse;   Physician Team Member Dr Fannie Minaya Team Member YOVANNY [facility] Management Team Member Romaine Leblanc   Patient/Family Present   Patient Present No   Patient's Family Present No   CM approached pt to see if she would like to participate in tx plan review, pt declined  Tx team reviewed tx plan  Tx team signed tx plan  Tx plan placed in pt d/c folder

## 2021-08-06 NOTE — NURSING NOTE
Pt given manual breast pump to express milk, encouraged to notify staff when feeling the need to do again, even if overnight  Pt acknowledged  Pump cleaned and returned to locker

## 2021-08-06 NOTE — NURSING NOTE
Pt remains pleasant and calm  Denies SI/HI, verbally contracts for safety  Expresses feeling ready for discharge stating she wants to attend therapy sessions to cope with depression  Pt remains positive and hopeful  Reports she slept well overnight  Pt reports she is currently nursing however weaning her daughter off so only needs to utilize breastpump before bed

## 2021-08-06 NOTE — CONSULTS
[address] [facility] [place] 1985, 39 y o  female MRN: [id number]  Unit/Bed#: Nazia Tinoco 253-01 Encounter: [id number]  Primary Care Provider: Christiano Herrera   Date and time admitted to hospital: 8/5/2021 11:49 AM    Inpatient consult for Medical Clearance for [address] patient  Consult performed by: Robert Keys PA-C  Consult ordered by: Jessie Carlos MD          Medical clearance for psychiatric admission  Assessment & Plan   No admission labs available   No admission labs pending   Vitals stable    UDS negative   COVID-19 negative   EKG 7/2/20 reveals NSR, 83bpm    Medically stable for continued inpatient psychiatric treatment      Flexural eczema  Assessment & Plan  · Continue home topical management    * Moderate episode of recurrent major depressive disorder ([facility] [address] )  Assessment & Plan  · Management per psychiatry      VTE Prophylaxis:   Low Risk (Score 0-2) - Encourage Ambulation  Recommendations for Discharge:  · Discharge timeline per primary team   Routine follow-up with primary care provider  Counseling / Coordination of Care Time: 30 minutes Greater than 50% of total time spent on patient counseling and coordination of care  Collaboration of Care: Were Recommendations Directly Discussed with Primary Treatment Team? No    History of Present Illness:  Brian Reynoso is a 39 y o  female who is originally admitted to the behavioral health service due to depression  We are consulted for management of chronic medical conditions  Patient reports a past medical history of eczema for which she does take topical medication at home  Patient should continue all prior to admission medications as prescribed by primary care provider/outpatient specialists  Patient denies recent travel, illness or sick contacts  Patient denies smoking, drinking or drug use  Available admission lab work and vitals are acceptable  Patient feels a baseline physical health  Patient appears medically stable for inpatient psychiatric treatment at this time  Review of Systems:  Review of Systems    Past Medical and Surgical History:   Past Medical History:   Diagnosis Date    Depression     Hypertension     with pregnancy    Pre-eclampsia        Past Surgical History:   Procedure Laterality Date    KNEE ARTHROSCOPY         Meds/Allergies:  PTA meds:   Prior to Admission Medications   Prescriptions Last Dose Informant Patient Reported? Taking?   triamcinolone (KENALOG) 0 5 % cream Not Taking at Unknown time Self No No   Sig: Apply topically 2 (two) times a day   Patient not taking: Reported on 8/5/2021      Facility-Administered Medications: None       Allergies: Allergies   Allergen Reactions    Pepcid [Famotidine]     Shellfish-Derived Products - Food Allergy        Social History:  Marital Status: Single  Substance Use History:   Social History     Substance and Sexual Activity   Alcohol Use Yes    Comment: socially     Social History     Tobacco Use   Smoking Status Current Some Day Smoker   Smokeless Tobacco Never Used   Tobacco Comment    smokes 1 cigarette "socially"      Social History     Substance and Sexual Activity   Drug Use Never       Family History:  Family History   Problem Relation Age of Onset    Asthma Mother     Anemia Mother     Arthritis Mother        Physical Exam:   Vitals:   Blood Pressure: 125/74 (08/06/21 0727)  Pulse: 73 (08/06/21 0727)  Temperature: 98 3 °F (36 8 °C) (08/06/21 0727)  Temp Source: Tympanic (08/06/21 0727)  Respirations: 16 (08/06/21 0727)  Height: 5' 2" (157 5 cm) (08/05/21 1158)  Weight - Scale: 66 1 kg (145 lb 12 8 oz) (08/05/21 1158)  SpO2: 99 % (08/05/21 1158)    Physical Exam  Vitals and nursing note reviewed  Constitutional:       General: She is awake  She is not in acute distress  HENT:      Head: Normocephalic and atraumatic  Cardiovascular:      Rate and Rhythm: Normal rate and regular rhythm        Heart sounds: No murmur heard  Pulmonary:      Effort: Pulmonary effort is normal       Breath sounds: Normal breath sounds  Abdominal:      General: There is no distension  Palpations: Abdomen is soft  Musculoskeletal:      Right lower leg: No edema  Left lower leg: No edema  Skin:     General: Skin is warm and dry  Neurological:      Mental Status: She is alert  Comments: CN II-XII grossly intact         Additional Data:   Lab Results:                    No results found for: HGBA1C            Imaging: No pertinent imaging reviewed  No orders to display       EKG, Pathology, and Other Studies Reviewed on Admission:   · EKG: NSR  HR 83bpm,   ** Please Note: This note may have been constructed using a voice recognition system   **

## 2021-08-06 NOTE — ASSESSMENT & PLAN NOTE
 No admission labs available   No admission labs pending   Vitals stable    UDS negative   COVID-19 negative   EKG 7/2/20 reveals NSR, 83bpm    Medically stable for continued inpatient psychiatric treatment

## 2021-08-06 NOTE — NURSING NOTE
AVS discharge instructions reviewed with patient, patient denies any concerns or questions and expresses readiness for discharge  Patient discharged from the unit pleasant and calm

## 2021-08-06 NOTE — PROGRESS NOTES
08/06/21 0840   Team Meeting   Meeting Type Daily Rounds   Team Members Present   Team Members Present Physician;Nurse;;Occupational Therapist   Physician Team Member Dr Yolanda Salas Team Member YOVANNY [facility] Management Team Member Yaz NIELSON Team Member Carrie   Patient/Family Present   Patient Present No   Patient's Family Present No   Daily Rounds Note Report- first admission, denies SI, difficulty with sleep, d/c today

## 2021-08-25 ENCOUNTER — LAB (OUTPATIENT)
Dept: LAB | Facility: CLINIC | Age: 36
End: 2021-08-25
Payer: COMMERCIAL

## 2021-08-25 DIAGNOSIS — F32.A FATIGUE DUE TO DEPRESSION: ICD-10-CM

## 2021-08-25 DIAGNOSIS — F33.1 MODERATE EPISODE OF RECURRENT MAJOR DEPRESSIVE DISORDER (HCC): ICD-10-CM

## 2021-08-25 DIAGNOSIS — R53.83 FATIGUE DUE TO DEPRESSION: ICD-10-CM

## 2021-08-25 LAB
25(OH)D3 SERPL-MCNC: 16.9 NG/ML (ref 30–100)
ALBUMIN SERPL BCP-MCNC: 3.8 G/DL (ref 3.5–5)
ALP SERPL-CCNC: 91 U/L (ref 46–116)
ALT SERPL W P-5'-P-CCNC: 23 U/L (ref 12–78)
ANION GAP SERPL CALCULATED.3IONS-SCNC: 4 MMOL/L (ref 4–13)
AST SERPL W P-5'-P-CCNC: 13 U/L (ref 5–45)
BASOPHILS # BLD AUTO: 0.07 THOUSANDS/ΜL (ref 0–0.1)
BASOPHILS NFR BLD AUTO: 1 % (ref 0–1)
BILIRUB SERPL-MCNC: 0.62 MG/DL (ref 0.2–1)
BUN SERPL-MCNC: 14 MG/DL (ref 5–25)
CALCIUM SERPL-MCNC: 9.2 MG/DL (ref 8.3–10.1)
CHLORIDE SERPL-SCNC: 110 MMOL/L (ref 100–108)
CO2 SERPL-SCNC: 26 MMOL/L (ref 21–32)
CREAT SERPL-MCNC: 1 MG/DL (ref 0.6–1.3)
EOSINOPHIL # BLD AUTO: 0.24 THOUSAND/ΜL (ref 0–0.61)
EOSINOPHIL NFR BLD AUTO: 3 % (ref 0–6)
ERYTHROCYTE [DISTWIDTH] IN BLOOD BY AUTOMATED COUNT: 12.3 % (ref 11.6–15.1)
GFR SERPL CREATININE-BSD FRML MDRD: 84 ML/MIN/1.73SQ M
GLUCOSE P FAST SERPL-MCNC: 89 MG/DL (ref 65–99)
HCT VFR BLD AUTO: 38 % (ref 34.8–46.1)
HGB BLD-MCNC: 12.6 G/DL (ref 11.5–15.4)
IMM GRANULOCYTES # BLD AUTO: 0.03 THOUSAND/UL (ref 0–0.2)
IMM GRANULOCYTES NFR BLD AUTO: 0 % (ref 0–2)
LYMPHOCYTES # BLD AUTO: 2.75 THOUSANDS/ΜL (ref 0.6–4.47)
LYMPHOCYTES NFR BLD AUTO: 31 % (ref 14–44)
MCH RBC QN AUTO: 30.4 PG (ref 26.8–34.3)
MCHC RBC AUTO-ENTMCNC: 33.2 G/DL (ref 31.4–37.4)
MCV RBC AUTO: 92 FL (ref 82–98)
MONOCYTES # BLD AUTO: 0.61 THOUSAND/ΜL (ref 0.17–1.22)
MONOCYTES NFR BLD AUTO: 7 % (ref 4–12)
NEUTROPHILS # BLD AUTO: 5.09 THOUSANDS/ΜL (ref 1.85–7.62)
NEUTS SEG NFR BLD AUTO: 58 % (ref 43–75)
NRBC BLD AUTO-RTO: 0 /100 WBCS
PLATELET # BLD AUTO: 253 THOUSANDS/UL (ref 149–390)
PMV BLD AUTO: 12 FL (ref 8.9–12.7)
POTASSIUM SERPL-SCNC: 4 MMOL/L (ref 3.5–5.3)
PROT SERPL-MCNC: 7.6 G/DL (ref 6.4–8.2)
RBC # BLD AUTO: 4.15 MILLION/UL (ref 3.81–5.12)
SODIUM SERPL-SCNC: 140 MMOL/L (ref 136–145)
TSH SERPL DL<=0.05 MIU/L-ACNC: 0.85 UIU/ML (ref 0.36–3.74)
VIT B12 SERPL-MCNC: 637 PG/ML (ref 100–900)
WBC # BLD AUTO: 8.79 THOUSAND/UL (ref 4.31–10.16)

## 2021-08-25 PROCEDURE — 85025 COMPLETE CBC W/AUTO DIFF WBC: CPT

## 2021-08-25 PROCEDURE — 84443 ASSAY THYROID STIM HORMONE: CPT

## 2021-08-25 PROCEDURE — 80053 COMPREHEN METABOLIC PANEL: CPT

## 2021-08-25 PROCEDURE — 82607 VITAMIN B-12: CPT

## 2021-08-25 PROCEDURE — 36415 COLL VENOUS BLD VENIPUNCTURE: CPT

## 2021-08-25 PROCEDURE — 82306 VITAMIN D 25 HYDROXY: CPT

## 2021-08-26 DIAGNOSIS — E55.9 VITAMIN D DEFICIENCY: Primary | ICD-10-CM

## 2021-08-26 RX ORDER — ERGOCALCIFEROL 1.25 MG/1
50000 CAPSULE ORAL WEEKLY
Qty: 12 CAPSULE | Refills: 1 | Status: SHIPPED | OUTPATIENT
Start: 2021-08-26

## 2021-09-06 ENCOUNTER — HOSPITAL ENCOUNTER (EMERGENCY)
Facility: HOSPITAL | Age: 36
Discharge: HOME | End: 2021-09-06
Attending: EMERGENCY MEDICINE
Payer: COMMERCIAL

## 2021-09-06 VITALS
DIASTOLIC BLOOD PRESSURE: 75 MMHG | RESPIRATION RATE: 16 BRPM | TEMPERATURE: 98.8 F | HEART RATE: 65 BPM | SYSTOLIC BLOOD PRESSURE: 121 MMHG | OXYGEN SATURATION: 100 %

## 2021-09-06 DIAGNOSIS — R10.31 BILATERAL LOWER ABDOMINAL CRAMPING: ICD-10-CM

## 2021-09-06 DIAGNOSIS — R11.0 NAUSEA: ICD-10-CM

## 2021-09-06 DIAGNOSIS — R51.9 ACUTE NONINTRACTABLE HEADACHE: Primary | ICD-10-CM

## 2021-09-06 DIAGNOSIS — R10.32 BILATERAL LOWER ABDOMINAL CRAMPING: ICD-10-CM

## 2021-09-06 LAB
ALBUMIN SERPL BCP-MCNC: 4.1 G/DL (ref 3.5–5)
ALP SERPL-CCNC: 91 U/L (ref 46–116)
ALT SERPL W P-5'-P-CCNC: 16 U/L (ref 12–78)
ANION GAP SERPL CALCULATED.3IONS-SCNC: 8 MMOL/L (ref 4–13)
AST SERPL W P-5'-P-CCNC: 22 U/L (ref 5–45)
BASOPHILS # BLD AUTO: 0.05 THOUSANDS/ΜL (ref 0–0.1)
BASOPHILS NFR BLD AUTO: 1 % (ref 0–1)
BILIRUB SERPL-MCNC: 0.57 MG/DL (ref 0.2–1)
BILIRUB UR QL STRIP: NEGATIVE
BUN SERPL-MCNC: 12 MG/DL (ref 5–25)
CALCIUM SERPL-MCNC: 8.9 MG/DL (ref 8.3–10.1)
CHLORIDE SERPL-SCNC: 105 MMOL/L (ref 100–108)
CLARITY UR: CLEAR
CO2 SERPL-SCNC: 27 MMOL/L (ref 21–32)
COLOR UR: NORMAL
CREAT SERPL-MCNC: 1.01 MG/DL (ref 0.6–1.3)
EOSINOPHIL # BLD AUTO: 0.23 THOUSAND/ΜL (ref 0–0.61)
EOSINOPHIL NFR BLD AUTO: 4 % (ref 0–6)
ERYTHROCYTE [DISTWIDTH] IN BLOOD BY AUTOMATED COUNT: 11.9 % (ref 11.6–15.1)
EXT PREG TEST URINE: NEGATIVE
EXT. CONTROL ED NAV: NORMAL
GFR SERPL CREATININE-BSD FRML MDRD: 83 ML/MIN/1.73SQ M
GLUCOSE SERPL-MCNC: 89 MG/DL (ref 65–140)
GLUCOSE UR STRIP-MCNC: NEGATIVE MG/DL
HCT VFR BLD AUTO: 39 % (ref 34.8–46.1)
HGB BLD-MCNC: 12.6 G/DL (ref 11.5–15.4)
HGB UR QL STRIP.AUTO: NEGATIVE
IMM GRANULOCYTES # BLD AUTO: 0.01 THOUSAND/UL (ref 0–0.2)
IMM GRANULOCYTES NFR BLD AUTO: 0 % (ref 0–2)
KETONES UR STRIP-MCNC: NEGATIVE MG/DL
LEUKOCYTE ESTERASE UR QL STRIP: NEGATIVE
LIPASE SERPL-CCNC: 146 U/L (ref 73–393)
LYMPHOCYTES # BLD AUTO: 2.2 THOUSANDS/ΜL (ref 0.6–4.47)
LYMPHOCYTES NFR BLD AUTO: 34 % (ref 14–44)
MCH RBC QN AUTO: 29.1 PG (ref 26.8–34.3)
MCHC RBC AUTO-ENTMCNC: 32.3 G/DL (ref 31.4–37.4)
MCV RBC AUTO: 90 FL (ref 82–98)
MONOCYTES # BLD AUTO: 0.45 THOUSAND/ΜL (ref 0.17–1.22)
MONOCYTES NFR BLD AUTO: 7 % (ref 4–12)
NEUTROPHILS # BLD AUTO: 3.56 THOUSANDS/ΜL (ref 1.85–7.62)
NEUTS SEG NFR BLD AUTO: 54 % (ref 43–75)
NITRITE UR QL STRIP: NEGATIVE
NRBC BLD AUTO-RTO: 0 /100 WBCS
PH UR STRIP.AUTO: 6 [PH]
PLATELET # BLD AUTO: 247 THOUSANDS/UL (ref 149–390)
PMV BLD AUTO: 12 FL (ref 8.9–12.7)
POTASSIUM SERPL-SCNC: 4.4 MMOL/L (ref 3.5–5.3)
PROT SERPL-MCNC: 8.2 G/DL (ref 6.4–8.2)
PROT UR STRIP-MCNC: NEGATIVE MG/DL
RBC # BLD AUTO: 4.33 MILLION/UL (ref 3.81–5.12)
SODIUM SERPL-SCNC: 140 MMOL/L (ref 136–145)
SP GR UR STRIP.AUTO: <=1.005 (ref 1–1.03)
UROBILINOGEN UR QL STRIP.AUTO: 0.2 E.U./DL
WBC # BLD AUTO: 6.5 THOUSAND/UL (ref 4.31–10.16)

## 2021-09-06 PROCEDURE — 83690 ASSAY OF LIPASE: CPT | Performed by: PHYSICIAN ASSISTANT

## 2021-09-06 PROCEDURE — 99285 EMERGENCY DEPT VISIT HI MDM: CPT | Performed by: PHYSICIAN ASSISTANT

## 2021-09-06 PROCEDURE — 96375 TX/PRO/DX INJ NEW DRUG ADDON: CPT

## 2021-09-06 PROCEDURE — 81025 URINE PREGNANCY TEST: CPT | Performed by: PHYSICIAN ASSISTANT

## 2021-09-06 PROCEDURE — 96365 THER/PROPH/DIAG IV INF INIT: CPT

## 2021-09-06 PROCEDURE — 80053 COMPREHEN METABOLIC PANEL: CPT | Performed by: PHYSICIAN ASSISTANT

## 2021-09-06 PROCEDURE — 81003 URINALYSIS AUTO W/O SCOPE: CPT | Performed by: PHYSICIAN ASSISTANT

## 2021-09-06 PROCEDURE — 99284 EMERGENCY DEPT VISIT MOD MDM: CPT

## 2021-09-06 PROCEDURE — 85025 COMPLETE CBC W/AUTO DIFF WBC: CPT | Performed by: PHYSICIAN ASSISTANT

## 2021-09-06 PROCEDURE — 36415 COLL VENOUS BLD VENIPUNCTURE: CPT | Performed by: PHYSICIAN ASSISTANT

## 2021-09-06 RX ORDER — DIPHENHYDRAMINE HYDROCHLORIDE 50 MG/ML
25 INJECTION INTRAMUSCULAR; INTRAVENOUS ONCE
Status: COMPLETED | OUTPATIENT
Start: 2021-09-06 | End: 2021-09-06

## 2021-09-06 RX ORDER — ONDANSETRON 4 MG/1
4 TABLET, ORALLY DISINTEGRATING ORAL
COMMUNITY
Start: 2021-09-04 | End: 2022-03-03 | Stop reason: ALTCHOICE

## 2021-09-06 RX ORDER — DEXAMETHASONE SODIUM PHOSPHATE 10 MG/ML
10 INJECTION, SOLUTION INTRAMUSCULAR; INTRAVENOUS ONCE
Status: COMPLETED | OUTPATIENT
Start: 2021-09-06 | End: 2021-09-06

## 2021-09-06 RX ORDER — METOCLOPRAMIDE HYDROCHLORIDE 5 MG/ML
10 INJECTION INTRAMUSCULAR; INTRAVENOUS ONCE
Status: COMPLETED | OUTPATIENT
Start: 2021-09-06 | End: 2021-09-06

## 2021-09-06 RX ORDER — DICYCLOMINE HCL 20 MG
20 TABLET ORAL
COMMUNITY
Start: 2021-09-04 | End: 2021-09-08 | Stop reason: SDUPTHER

## 2021-09-06 RX ORDER — MAGNESIUM SULFATE HEPTAHYDRATE 40 MG/ML
2 INJECTION, SOLUTION INTRAVENOUS ONCE
Status: COMPLETED | OUTPATIENT
Start: 2021-09-06 | End: 2021-09-06

## 2021-09-06 RX ADMIN — SODIUM CHLORIDE 1000 ML: 0.9 INJECTION, SOLUTION INTRAVENOUS at 11:21

## 2021-09-06 RX ADMIN — MAGNESIUM SULFATE HEPTAHYDRATE 2 G: 40 INJECTION, SOLUTION INTRAVENOUS at 11:25

## 2021-09-06 RX ADMIN — DIPHENHYDRAMINE HYDROCHLORIDE 25 MG: 50 INJECTION, SOLUTION INTRAMUSCULAR; INTRAVENOUS at 11:22

## 2021-09-06 RX ADMIN — DEXAMETHASONE SODIUM PHOSPHATE 10 MG: 10 INJECTION, SOLUTION INTRAMUSCULAR; INTRAVENOUS at 11:22

## 2021-09-06 RX ADMIN — METOCLOPRAMIDE HYDROCHLORIDE 10 MG: 5 INJECTION INTRAMUSCULAR; INTRAVENOUS at 11:22

## 2021-09-06 NOTE — Clinical Note
Chai Ardon was seen and treated in our emergency department on 9/6/2021  Diagnosis:     Gertrude Hardy  may return to work on return date  She may return on this date: 09/08/2021         If you have any questions or concerns, please don't hesitate to call        Larisa Norris PA-C    ______________________________           _______________          _______________  Hospital Representative                              Date                                Time

## 2021-09-06 NOTE — ED PROVIDER NOTES
History  Chief Complaint   Patient presents with    Abdominal Pain     Pt c/o generilized abd pain, headache and vomiting since saturday  43-year-old female with a history of depression presenting for evaluation multiple complaints  She currently complains of lower abdominal pain, nausea, vomiting, and a headache  She has been having abdominal pain for the past 2 days  She describes a cramping sensation in her lower abdomen which feels like labor pains " She is also having nausea and vomiting  Patient was seen at [facility] ED 3 days ago for the same  She had blood work, urinalysis, and a CT abdomen  CT abdomen was negative for acute findings  She was given prescriptions for Zofran and Bentyl  Patient was also seen at urgent care this morning and was referred to the ER for evaluation  Patient took 100 mg ibuprofen and Bentyl several hours ago and she now states that her abdominal pain has resolved  Her main complaint at this time is a headache which was gradual in onset and moderate in intensity  She denies any fevers, chills, dysuria, hematuria, vaginal bleeding, vaginal discharge, diarrhea, constipation, URI symptoms, neck stiffness          History provided by:  Patient and medical records   used: No    Abdominal Pain  Pain location:  LLQ, RLQ and suprapubic  Pain quality: cramping    Pain radiates to:  Does not radiate  Pain severity:  Moderate  Onset quality:  Gradual  Timing:  Intermittent  Progression:  Waxing and waning  Chronicity:  New  Context: not trauma    Relieved by:  NSAIDs  Worsened by:  Nothing  Associated symptoms: nausea and vomiting    Associated symptoms: no belching, no chest pain, no chills, no constipation, no cough, no diarrhea, no dysuria, no fatigue, no fever, no flatus, no hematochezia, no hematuria, no shortness of breath, no vaginal bleeding and no vaginal discharge        Prior to Admission Medications   Prescriptions Last Dose Informant Patient Reported? Taking?   dicyclomine (BENTYL) 20 mg tablet   Yes Yes   Sig: Take 20 mg by mouth   ergocalciferol (VITAMIN D2) 50,000 units   No No   Sig: Take 1 capsule (50,000 Units total) by mouth once a week   ondansetron (ZOFRAN-ODT) 4 mg disintegrating tablet   Yes Yes   Sig: Take 4 mg by mouth   sertraline (ZOLOFT) 50 mg tablet   Yes Yes   Sig: Take 50 mg by mouth daily   triamcinolone (KENALOG) 0 5 % cream  Self No No   Sig: Apply topically 2 (two) times a day   Patient not taking: Reported on 8/5/2021      Facility-Administered Medications: None       Past Medical History:   Diagnosis Date    Depression     Hypertension     with pregnancy    Pre-eclampsia        Past Surgical History:   Procedure Laterality Date    KNEE ARTHROSCOPY         Family History   Problem Relation Age of Onset    Asthma Mother     Anemia Mother     Arthritis Mother      I have reviewed and agree with the history as documented  E-Cigarette/Vaping    E-Cigarette Use Never User      E-Cigarette/Vaping Substances    Nicotine No     THC No     CBD No     Flavoring No     Other No     Unknown No      Social History     Tobacco Use    Smoking status: Former Smoker    Smokeless tobacco: Never Used    Tobacco comment: smokes 1 cigarette "socially"    Vaping Use    Vaping Use: Never used   Substance Use Topics    Alcohol use: Yes     Comment: socially    Drug use: Never       Review of Systems   Constitutional: Negative for chills, fatigue and fever  HENT: Negative for drooling and voice change  Eyes: Negative for discharge and redness  Respiratory: Negative for cough, shortness of breath and stridor  Cardiovascular: Negative for chest pain and leg swelling  Gastrointestinal: Positive for abdominal pain, nausea and vomiting  Negative for constipation, diarrhea, flatus and hematochezia  Genitourinary: Negative for dysuria, hematuria, vaginal bleeding and vaginal discharge     Musculoskeletal: Negative for neck pain and neck stiffness  Skin: Negative for color change and rash  Neurological: Positive for headaches  Negative for seizures, syncope, facial asymmetry, speech difficulty and numbness  Psychiatric/Behavioral: Negative for confusion  The patient is not nervous/anxious  All other systems reviewed and are negative  Physical Exam  Physical Exam  Vitals and nursing note reviewed  Constitutional:       General: She is not in acute distress  Appearance: She is well-developed  She is not diaphoretic  Comments: Nontoxic appearing   HENT:      Head: Normocephalic and atraumatic  Right Ear: External ear normal       Left Ear: External ear normal       Nose: Nose normal    Eyes:      General: No scleral icterus  Right eye: No discharge  Left eye: No discharge  Conjunctiva/sclera: Conjunctivae normal    Neck:      Comments: No meningismus  Cardiovascular:      Rate and Rhythm: Normal rate and regular rhythm  Heart sounds: Normal heart sounds  No murmur heard  Pulmonary:      Effort: Pulmonary effort is normal  No respiratory distress  Breath sounds: Normal breath sounds  No stridor  No wheezing or rales  Abdominal:      General: Bowel sounds are normal  There is no distension  Palpations: Abdomen is soft  Tenderness: There is no abdominal tenderness  There is no guarding  Comments: Abdomen soft, nontender, nondistended   Musculoskeletal:         General: No deformity  Normal range of motion  Cervical back: Normal range of motion and neck supple  No rigidity  Lymphadenopathy:      Cervical: No cervical adenopathy  Skin:     General: Skin is warm and dry  Neurological:      General: No focal deficit present  Mental Status: She is alert  She is not disoriented  GCS: GCS eye subscore is 4  GCS verbal subscore is 5  GCS motor subscore is 6     Psychiatric:         Mood and Affect: Mood normal          Behavior: Behavior normal  Vital Signs  ED Triage Vitals   Temperature Pulse Respirations Blood Pressure SpO2   09/06/21 0946 09/06/21 0946 09/06/21 0946 09/06/21 0946 09/06/21 0946   98 8 °F (37 1 °C) 62 18 118/73 100 %      Temp Source Heart Rate Source Patient Position - Orthostatic VS BP Location FiO2 (%)   09/06/21 0946 09/06/21 0946 09/06/21 0946 09/06/21 0946 --   Oral Monitor Sitting Left arm       Pain Score       09/06/21 1243       No Pain           Vitals:    09/06/21 0946 09/06/21 1243   BP: 118/73 121/75   Pulse: 62 65   Patient Position - Orthostatic VS: Sitting          Visual Acuity      ED Medications  Medications   sodium chloride 0 9 % bolus 1,000 mL (0 mL Intravenous Stopped 9/6/21 1221)   diphenhydrAMINE (BENADRYL) injection 25 mg (25 mg Intravenous Given 9/6/21 1122)   metoclopramide (REGLAN) injection 10 mg (10 mg Intravenous Given 9/6/21 1122)   dexamethasone (PF) (DECADRON) injection 10 mg (10 mg Intravenous Given 9/6/21 1122)   magnesium sulfate 2 g/50 mL IVPB (premix) 2 g (0 g Intravenous Stopped 9/6/21 1225)       Diagnostic Studies  Results Reviewed     Procedure Component Value Units Date/Time    Lipase [[id number]]  (Normal) Collected: 09/06/21 1120    Lab Status: Final result Specimen: Blood from Arm, Right Updated: 09/06/21 1156     Lipase 146 u/L     Comprehensive metabolic panel [[id number]] Collected: 09/06/21 1120    Lab Status: Final result Specimen: Blood from Arm, Right Updated: 09/06/21 1156     Sodium 140 mmol/L      Potassium 4 4 mmol/L      Chloride 105 mmol/L      CO2 27 mmol/L      ANION GAP 8 mmol/L      BUN 12 mg/dL      Creatinine 1 01 mg/dL      Glucose 89 mg/dL      Calcium 8 9 mg/dL      AST 22 U/L      ALT 16 U/L      Alkaline Phosphatase 91 U/L      Total Protein 8 2 g/dL      Albumin 4 1 g/dL      Total Bilirubin 0 57 mg/dL      eGFR 83 ml/min/1 73sq m     Narrative:      [place] guidelines for Chronic Kidney Disease (CKD):     Stage 1 with normal or high GFR (GFR > 90 mL/min/1 73 square meters)    Stage 2 Mild CKD (GFR = 60-89 mL/min/1 73 square meters)    Stage 3A Moderate CKD (GFR = 45-59 mL/min/1 73 square meters)    Stage 3B Moderate CKD (GFR = 30-44 mL/min/1 73 square meters)    Stage 4 Severe CKD (GFR = 15-29 mL/min/1 73 square meters)    Stage 5 End Stage CKD (GFR <15 mL/min/1 73 square meters)  Note: GFR calculation is accurate only with a steady state creatinine    POCT pregnancy, urine [[id number]]  (Normal) Resulted: 09/06/21 1115    Lab Status: Final result Updated: 09/06/21 1148     EXT PREG TEST UR (Ref: Negative) NEGATIVE     Control V    UA w Reflex to Microscopic w Reflex to Culture [[id number]] Collected: 09/06/21 1116    Lab Status: Final result Specimen: Urine, Clean Catch Updated: 09/06/21 1143     Color, UA Light Yellow     Clarity, UA Clear     Specific Gravity, UA <=1 005     pH, UA 6 0     Leukocytes, UA Negative     Nitrite, UA Negative     Protein, UA Negative mg/dl      Glucose, UA Negative mg/dl      Ketones, UA Negative mg/dl      Urobilinogen, UA 0 2 E U /dl      Bilirubin, UA Negative     Blood, UA Negative    CBC and differential [[id number]] Collected: 09/06/21 1120    Lab Status: Final result Specimen: Blood from Arm, Right Updated: 09/06/21 1140     WBC 6 50 Thousand/uL      RBC 4 33 Million/uL      Hemoglobin 12 6 g/dL      Hematocrit 39 0 %      MCV 90 fL      MCH 29 1 pg      MCHC 32 3 g/dL      RDW 11 9 %      MPV 12 0 fL      Platelets 316 Thousands/uL      nRBC 0 /100 WBCs      Neutrophils Relative 54 %      Immat GRANS % 0 %      Lymphocytes Relative 34 %      Monocytes Relative 7 %      Eosinophils Relative 4 %      Basophils Relative 1 %      Neutrophils Absolute 3 56 Thousands/µL      Immature Grans Absolute 0 01 Thousand/uL      Lymphocytes Absolute 2 20 Thousands/µL      Monocytes Absolute 0 45 Thousand/µL      Eosinophils Absolute 0 23 Thousand/µL      Basophils Absolute 0 05 Thousands/µL                  No orders to display              Procedures  Procedures         ED Course  ED Course as of Sep 06 1652   [facility] Sep 06, 2021   1209 Patient feeling significantly improved on re-evaluation  Headache and nausea have resolved  No current abdominal pain  Patient stable for discharge  MDM  Number of Diagnoses or Management Options  Acute nonintractable headache: new and requires workup  Bilateral lower abdominal cramping: new and requires workup  Nausea: new and requires workup  Diagnosis management comments: 14-year-old female presenting for multiple complaints including lower abdominal cramping, nausea, vomiting, and a headache x2 days  Patient was seen at [address] [place] [address] ED 2 days ago for the same and had a complete workup including CT abdomen which was unremarkable  Her abdominal pain has resolved after ibuprofen and Bentyl  Patient is afebrile and hemodynamically stable  Abdominal exam is benign  Differential diagnosis includes but is not limited to: viral syndrome, migraine, gastritis, GERD, pancreatitis, hepatitis, cholecystitis, cholelithiasis, colitis, diverticulitis, appendicitis, mesenteric adenitis, UTI, pyelonephritis, SBO, constipation, kidney stone, musculoskeletal, nonspecific abdominal pain  Initial ED plan:  Check abdominal labs, UA, and U preg  Will give IV migraine cocktail and re-evaluate  No indication for repeat CT at this time as she had imaging 48 hours ago and her abdominal exam is unremarkable  Final assessment:  Labs unremarkable  Blood counts, electrolytes, renal function, LFTs, lipase are normal   UA bland without microscopic hematuria or signs of infection  Pregnancy test is negative  Patient re-evaluated after medications and states she feels great  Symptoms have completely resolved  Vitals remained stable  No indication for further workup at this time  Supportive care was discussed  Advised close PCP follow-up  ED return precautions discussed  Patient expressed understanding and is agreeable to plan  Patient discharged in stable condition  Amount and/or Complexity of Data Reviewed  Clinical lab tests: reviewed and ordered  Review and summarize past medical records: yes    Risk of Complications, Morbidity, and/or Mortality  Presenting problems: moderate  Diagnostic procedures: moderate  Management options: moderate    Patient Progress  Patient progress: improved      Disposition  Final diagnoses:   Acute nonintractable headache   Nausea   Bilateral lower abdominal cramping     Time reflects when diagnosis was documented in both MDM as applicable and the Disposition within this note     Time User Action Codes Description Comment    9/6/2021 12:11  [email or web address] [place] [R51 9] Acute nonintractable headache     9/6/2021 12:11  [employer] [facility] [R11 0] Nausea     9/6/2021 12:11  [employer] [facility] [R10 31,  R10 32] Bilateral lower abdominal cramping       ED Disposition     ED Disposition Condition Date/Time Comment    Discharge Stable Mon Sep 6, 2021 12:11 PM Delfin America discharge to home/self care              Follow-up Information     Follow up With Specialties Details Why Contact Info Additional [address], [address], Nurse Practitioner Schedule an appointment as soon as possible for a visit   [address] [facility] [place] [address] 9[address] [facility] Emergency Department Emergency Medicine  If symptoms worsen [address] Emergency Department, [address] [place], [place], [address]          Discharge Medication List as of 9/6/2021 12:12 PM      CONTINUE these medications which have NOT CHANGED    Details   dicyclomine (BENTYL) 20 mg tablet Take 20 mg by mouth, Starting Sat 9/4/2021, Until Thu 9/9/2021 at 2359, Historical Med      ondansetron (ZOFRAN-ODT) 4 mg disintegrating tablet Take 4 mg by mouth, Starting Sat 9/4/2021, Historical Med      sertraline (ZOLOFT) 50 mg tablet Take 50 mg by mouth daily, Starting Tue 8/17/2021, Until Wed 8/17/2022, Historical Med      ergocalciferol (VITAMIN D2) 50,000 units Take 1 capsule (50,000 Units total) by mouth once a week, Starting Thu 8/26/2021, Normal      triamcinolone (KENALOG) 0 5 % cream Apply topically 2 (two) times a day, Starting Wed 4/7/2021, Normal           No discharge procedures on file      PDMP Review       Value Time User    PDMP Reviewed  Yes 11/8/2019  7:26 AM Dev Carrillo MD          ED Provider  Electronically Signed by           Loki Segovia PA-C  09/06/21 8991

## 2021-09-06 NOTE — DISCHARGE INSTRUCTIONS
Take Zofran as needed for nausea  Take Tylenol 650mg and ibuprofen 600mg every 6 hours as needed for headaches/pain  Apply heating pad to affected area  Please follow-up with your family doctor in 2-3 days  Return to the ER with any worsening symptoms

## 2021-09-07 ENCOUNTER — HOSPITAL ENCOUNTER (EMERGENCY)
Facility: HOSPITAL | Age: 36
Discharge: HOME | End: 2021-09-07
Attending: EMERGENCY MEDICINE | Admitting: EMERGENCY MEDICINE
Payer: COMMERCIAL

## 2021-09-07 ENCOUNTER — APPOINTMENT (EMERGENCY)
Dept: ULTRASOUND IMAGING | Facility: HOSPITAL | Age: 36
End: 2021-09-07
Payer: COMMERCIAL

## 2021-09-07 ENCOUNTER — APPOINTMENT (EMERGENCY)
Dept: CT IMAGING | Facility: HOSPITAL | Age: 36
End: 2021-09-07
Payer: COMMERCIAL

## 2021-09-07 VITALS
WEIGHT: 145 LBS | RESPIRATION RATE: 19 BRPM | TEMPERATURE: 98.2 F | HEART RATE: 58 BPM | HEIGHT: 62 IN | BODY MASS INDEX: 26.68 KG/M2 | DIASTOLIC BLOOD PRESSURE: 88 MMHG | SYSTOLIC BLOOD PRESSURE: 153 MMHG | OXYGEN SATURATION: 100 %

## 2021-09-07 DIAGNOSIS — R11.2 NAUSEA AND VOMITING: ICD-10-CM

## 2021-09-07 DIAGNOSIS — R10.9 NONSPECIFIC ABDOMINAL PAIN: Primary | ICD-10-CM

## 2021-09-07 LAB
ALBUMIN SERPL BCP-MCNC: 4.4 G/DL (ref 3.5–5)
ALP SERPL-CCNC: 93 U/L (ref 46–116)
ALT SERPL W P-5'-P-CCNC: 20 U/L (ref 12–78)
ANION GAP SERPL CALCULATED.3IONS-SCNC: 12 MMOL/L (ref 4–13)
AST SERPL W P-5'-P-CCNC: 18 U/L (ref 5–45)
BASOPHILS # BLD AUTO: 0.02 THOUSANDS/ΜL (ref 0–0.1)
BASOPHILS NFR BLD AUTO: 0 % (ref 0–1)
BILIRUB SERPL-MCNC: 0.31 MG/DL (ref 0.2–1)
BUN SERPL-MCNC: 15 MG/DL (ref 5–25)
CALCIUM SERPL-MCNC: 9.7 MG/DL (ref 8.3–10.1)
CHLORIDE SERPL-SCNC: 105 MMOL/L (ref 100–108)
CO2 SERPL-SCNC: 24 MMOL/L (ref 21–32)
CREAT SERPL-MCNC: 0.98 MG/DL (ref 0.6–1.3)
EOSINOPHIL # BLD AUTO: 0 THOUSAND/ΜL (ref 0–0.61)
EOSINOPHIL NFR BLD AUTO: 0 % (ref 0–6)
ERYTHROCYTE [DISTWIDTH] IN BLOOD BY AUTOMATED COUNT: 11.9 % (ref 11.6–15.1)
GFR SERPL CREATININE-BSD FRML MDRD: 86 ML/MIN/1.73SQ M
GLUCOSE SERPL-MCNC: 107 MG/DL (ref 65–140)
HCT VFR BLD AUTO: 39.8 % (ref 34.8–46.1)
HGB BLD-MCNC: 13.3 G/DL (ref 11.5–15.4)
IMM GRANULOCYTES # BLD AUTO: 0.09 THOUSAND/UL (ref 0–0.2)
IMM GRANULOCYTES NFR BLD AUTO: 1 % (ref 0–2)
LIPASE SERPL-CCNC: 97 U/L (ref 73–393)
LYMPHOCYTES # BLD AUTO: 1.76 THOUSANDS/ΜL (ref 0.6–4.47)
LYMPHOCYTES NFR BLD AUTO: 12 % (ref 14–44)
MCH RBC QN AUTO: 29.6 PG (ref 26.8–34.3)
MCHC RBC AUTO-ENTMCNC: 33.4 G/DL (ref 31.4–37.4)
MCV RBC AUTO: 89 FL (ref 82–98)
MONOCYTES # BLD AUTO: 0.54 THOUSAND/ΜL (ref 0.17–1.22)
MONOCYTES NFR BLD AUTO: 4 % (ref 4–12)
NEUTROPHILS # BLD AUTO: 12.79 THOUSANDS/ΜL (ref 1.85–7.62)
NEUTS SEG NFR BLD AUTO: 83 % (ref 43–75)
NRBC BLD AUTO-RTO: 0 /100 WBCS
PLATELET # BLD AUTO: 301 THOUSANDS/UL (ref 149–390)
PMV BLD AUTO: 11.8 FL (ref 8.9–12.7)
POTASSIUM SERPL-SCNC: 4.1 MMOL/L (ref 3.5–5.3)
PROT SERPL-MCNC: 8.7 G/DL (ref 6.4–8.2)
RBC # BLD AUTO: 4.49 MILLION/UL (ref 3.81–5.12)
SODIUM SERPL-SCNC: 141 MMOL/L (ref 136–145)
WBC # BLD AUTO: 15.2 THOUSAND/UL (ref 4.31–10.16)

## 2021-09-07 PROCEDURE — 99284 EMERGENCY DEPT VISIT MOD MDM: CPT

## 2021-09-07 PROCEDURE — G1004 CDSM NDSC: HCPCS

## 2021-09-07 PROCEDURE — 80053 COMPREHEN METABOLIC PANEL: CPT | Performed by: PHYSICIAN ASSISTANT

## 2021-09-07 PROCEDURE — 74177 CT ABD & PELVIS W/CONTRAST: CPT

## 2021-09-07 PROCEDURE — C9113 INJ PANTOPRAZOLE SODIUM, VIA: HCPCS | Performed by: PHYSICIAN ASSISTANT

## 2021-09-07 PROCEDURE — 76856 US EXAM PELVIC COMPLETE: CPT

## 2021-09-07 PROCEDURE — 96374 THER/PROPH/DIAG INJ IV PUSH: CPT

## 2021-09-07 PROCEDURE — 96361 HYDRATE IV INFUSION ADD-ON: CPT

## 2021-09-07 PROCEDURE — 96375 TX/PRO/DX INJ NEW DRUG ADDON: CPT

## 2021-09-07 PROCEDURE — 76830 TRANSVAGINAL US NON-OB: CPT

## 2021-09-07 PROCEDURE — 83690 ASSAY OF LIPASE: CPT | Performed by: PHYSICIAN ASSISTANT

## 2021-09-07 PROCEDURE — 85025 COMPLETE CBC W/AUTO DIFF WBC: CPT | Performed by: PHYSICIAN ASSISTANT

## 2021-09-07 PROCEDURE — 36415 COLL VENOUS BLD VENIPUNCTURE: CPT | Performed by: PHYSICIAN ASSISTANT

## 2021-09-07 PROCEDURE — 99284 EMERGENCY DEPT VISIT MOD MDM: CPT | Performed by: PHYSICIAN ASSISTANT

## 2021-09-07 PROCEDURE — 96376 TX/PRO/DX INJ SAME DRUG ADON: CPT

## 2021-09-07 RX ORDER — KETOROLAC TROMETHAMINE 30 MG/ML
15 INJECTION, SOLUTION INTRAMUSCULAR; INTRAVENOUS ONCE
Status: COMPLETED | OUTPATIENT
Start: 2021-09-07 | End: 2021-09-07

## 2021-09-07 RX ORDER — METOCLOPRAMIDE HYDROCHLORIDE 5 MG/ML
10 INJECTION INTRAMUSCULAR; INTRAVENOUS ONCE
Status: COMPLETED | OUTPATIENT
Start: 2021-09-07 | End: 2021-09-07

## 2021-09-07 RX ORDER — PANTOPRAZOLE SODIUM 40 MG/1
40 INJECTION, POWDER, FOR SOLUTION INTRAVENOUS ONCE
Status: COMPLETED | OUTPATIENT
Start: 2021-09-07 | End: 2021-09-07

## 2021-09-07 RX ORDER — METOCLOPRAMIDE 10 MG/1
10 TABLET ORAL EVERY 6 HOURS PRN
Qty: 30 TABLET | Refills: 0 | Status: SHIPPED | OUTPATIENT
Start: 2021-09-07 | End: 2022-03-03 | Stop reason: ALTCHOICE

## 2021-09-07 RX ORDER — ONDANSETRON 2 MG/ML
4 INJECTION INTRAMUSCULAR; INTRAVENOUS ONCE
Status: COMPLETED | OUTPATIENT
Start: 2021-09-07 | End: 2021-09-07

## 2021-09-07 RX ORDER — DIPHENHYDRAMINE HYDROCHLORIDE 50 MG/ML
25 INJECTION INTRAMUSCULAR; INTRAVENOUS ONCE
Status: COMPLETED | OUTPATIENT
Start: 2021-09-07 | End: 2021-09-07

## 2021-09-07 RX ORDER — IBUPROFEN 800 MG/1
800 TABLET ORAL EVERY 8 HOURS PRN
Qty: 21 TABLET | Refills: 0 | Status: SHIPPED | OUTPATIENT
Start: 2021-09-07

## 2021-09-07 RX ADMIN — KETOROLAC TROMETHAMINE 15 MG: 30 INJECTION, SOLUTION INTRAMUSCULAR; INTRAVENOUS at 05:59

## 2021-09-07 RX ADMIN — IOHEXOL 100 ML: 350 INJECTION, SOLUTION INTRAVENOUS at 06:59

## 2021-09-07 RX ADMIN — ONDANSETRON 4 MG: 2 INJECTION INTRAMUSCULAR; INTRAVENOUS at 05:59

## 2021-09-07 RX ADMIN — PANTOPRAZOLE SODIUM 40 MG: 40 INJECTION, POWDER, FOR SOLUTION INTRAVENOUS at 10:07

## 2021-09-07 RX ADMIN — KETOROLAC TROMETHAMINE 15 MG: 30 INJECTION, SOLUTION INTRAMUSCULAR; INTRAVENOUS at 10:07

## 2021-09-07 RX ADMIN — METOCLOPRAMIDE HYDROCHLORIDE 10 MG: 5 INJECTION INTRAMUSCULAR; INTRAVENOUS at 06:39

## 2021-09-07 RX ADMIN — SODIUM CHLORIDE 1000 ML: 0.9 INJECTION, SOLUTION INTRAVENOUS at 06:01

## 2021-09-07 RX ADMIN — DIPHENHYDRAMINE HYDROCHLORIDE 25 MG: 50 INJECTION, SOLUTION INTRAMUSCULAR; INTRAVENOUS at 06:32

## 2021-09-07 NOTE — DISCHARGE INSTRUCTIONS
Take Reglan as needed for nausea  Continue taking Bentyl as needed for abdominal cramping  Take ibuprofen as needed for pain  Drink small sips of water frequently to prevent dehydration  Advance slowly to bland diet (rice, applesauce, toast)  Please follow-up with your family doctor and gastroenterology  Return to the ER with any worsening symptoms

## 2021-09-07 NOTE — ED NOTES
Patient transported to [facility] 89, [address] [facility]  09/07/21 9060

## 2021-09-07 NOTE — ED CARE HANDOFF
Emergency Department Sign Out Note        Sign out and transfer of care from [facility]  See Separate Emergency Department note  The patient, Unique Erickson, was evaluated by the previous provider for abdominal pain and vomiting  Workup Completed:    Abdominal labs  Leukocytosis noted with a WBC of 15  Remainder of labs unremarkable  CT abdomen reveals prominent fluid-filled endometrial cavity in uterus  ED Course / Workup Pending (followup):  Pelvic ultrasound                 Procedures  MDM  Number of Diagnoses or Management Options  Nausea and vomiting: new and requires workup  Nonspecific abdominal pain: new and requires workup  Diagnosis management comments: Pelvic ultrasound reveals "Normal thickness of the endometrium  Small foci of calcification noted within the endometrium, nonspecific and without any associated with heterogeneity or abnormal thickening  No pelvic adnexal mass  Small amount of fluid in the pelvis likely physiological "    No imaging findings to explain her pain  Patient feeling improved on re-evaluation  No indication for admission at this time  Will refer to GI given multiple recent ED visits for recurrent abdominal pain  She already has scripts for Bentyl and Zofran at home but states Zofran isn't working  Scripts given for ibuprofen and Reglan  ED return precautions discussed  Patient expressed understanding and is agreeable to plan   Patient discharged in stable condition            Disposition  Final diagnoses:   Nonspecific abdominal pain   Nausea and vomiting     Time reflects when diagnosis was documented in both MDM as applicable and the Disposition within this note     Time User Action Codes Description Comment    9/7/2021  9:58 AM Mary Jane Meredith [R10 9] Nonspecific abdominal pain     9/7/2021  9:58 AM Tico Meredith [R11 2] Nausea and vomiting       ED Disposition     ED Disposition Condition Date/Time Comment    Discharge Stable Tue Sep 7, 2021 9:58 AM Guerline Hess discharge to home/self care  Follow-up Information     Follow up With Specialties Details Why Contact Info Additional [address], [address] Ike Cespedes, Nurse Practitioner Schedule an appointment as soon as possible for a visit   [address] [facility] [address]  [address] [place] [address] [facility] Schedule an appointment as soon as possible for a visit   [address]  [address] [address]  [address] [facility] [place]  [address] [place], [place], [address]     5[address] [facility] Emergency Department Emergency Medicine  If symptoms worsen 34 [facility] 109 [facility] Emergency Department, [address] [place], [place], [address]        Patient's Medications   Discharge Prescriptions    IBUPROFEN (MOTRIN) 800 MG TABLET    Take 1 tablet (800 mg total) by mouth every 8 (eight) hours as needed for mild pain       Start Date: 9/7/2021  End Date: --       Order Dose: 800 mg       Quantity: 21 tablet    Refills: 0    METOCLOPRAMIDE (REGLAN) 10 MG TABLET    Take 1 tablet (10 mg total) by mouth every 6 (six) hours as needed (nausea)       Start Date: 9/7/2021  End Date: --       Order Dose: 10 mg       Quantity: 30 tablet    Refills: 0     No discharge procedures on file         ED Provider  Electronically Signed by     Rosalind Dominguez PA-C  09/07/21 8470

## 2021-09-07 NOTE — ED PROVIDER NOTES
History  Chief Complaint   Patient presents with    Abdominal Pain     pt here earlier for abd pain and vomiting      Patient is a 59-year-old female with a past medical history significant for depression, hypertension who presents to the emergency department for evaluation of abdominal pain, nausea, vomiting, diarrhea that has been ongoing since Saturday  This is patient's 4th visit since Saturday for the same complaints  She has had negative CT scans and blood work previously  Patient states that she has been Zofran Bentyl without relief  She is not any fevers, chills, chest pain, difficulty breathing  Patient denies all other symptoms at this time  Prior to Admission Medications   Prescriptions Last Dose Informant Patient Reported? Taking?   dicyclomine (BENTYL) 20 mg tablet   Yes No   Sig: Take 20 mg by mouth   ergocalciferol (VITAMIN D2) 50,000 units   No No   Sig: Take 1 capsule (50,000 Units total) by mouth once a week   ondansetron (ZOFRAN-ODT) 4 mg disintegrating tablet   Yes No   Sig: Take 4 mg by mouth   sertraline (ZOLOFT) 50 mg tablet   Yes No   Sig: Take 50 mg by mouth daily   triamcinolone (KENALOG) 0 5 % cream  Self No No   Sig: Apply topically 2 (two) times a day   Patient not taking: Reported on 8/5/2021      Facility-Administered Medications: None       Past Medical History:   Diagnosis Date    Depression     Hypertension     with pregnancy    Pre-eclampsia        Past Surgical History:   Procedure Laterality Date    KNEE ARTHROSCOPY         Family History   Problem Relation Age of Onset    Asthma Mother     Anemia Mother     Arthritis Mother      I have reviewed and agree with the history as documented      E-Cigarette/Vaping    E-Cigarette Use Never User      E-Cigarette/Vaping Substances    Nicotine No     THC No     CBD No     Flavoring No     Other No     Unknown No      Social History     Tobacco Use    Smoking status: Former Smoker    Smokeless tobacco: Never Used    Tobacco comment: smokes 1 cigarette "socially"    Vaping Use    Vaping Use: Never used   Substance Use Topics    Alcohol use: Yes     Comment: socially    Drug use: Never       Review of Systems   Constitutional: Negative for chills, diaphoresis and fever  HENT: Negative for congestion, facial swelling, nosebleeds, sore throat and voice change  Eyes: Negative for pain and redness  Respiratory: Negative for cough, choking, chest tightness, shortness of breath and stridor  Cardiovascular: Negative for chest pain and palpitations  Gastrointestinal: Positive for abdominal pain, diarrhea, nausea and vomiting  Genitourinary: Negative for difficulty urinating, dysuria, flank pain, hematuria, vaginal bleeding and vaginal discharge  Musculoskeletal: Negative for arthralgias, back pain, myalgias, neck pain and neck stiffness  Skin: Negative for color change and rash  Neurological: Negative for dizziness, syncope, facial asymmetry, weakness, light-headedness, numbness and headaches  Psychiatric/Behavioral: Negative for confusion and suicidal ideas  All other systems reviewed and are negative  Physical Exam  Physical Exam  Vitals reviewed  Constitutional:       General: She is not in acute distress  Appearance: Normal appearance  She is not ill-appearing, toxic-appearing or diaphoretic  HENT:      Head: Normocephalic and atraumatic  Right Ear: External ear normal       Left Ear: External ear normal       Nose: Nose normal  No congestion or rhinorrhea  Mouth/Throat:      Mouth: Mucous membranes are moist       Pharynx: Oropharynx is clear  No oropharyngeal exudate or posterior oropharyngeal erythema  Eyes:      General: No scleral icterus  Right eye: No discharge  Left eye: No discharge  Extraocular Movements: Extraocular movements intact  Conjunctiva/sclera: Conjunctivae normal       Pupils: Pupils are equal, round, and reactive to light  Cardiovascular:      Rate and Rhythm: Normal rate and regular rhythm  Pulses: Normal pulses  Heart sounds: Normal heart sounds  No murmur heard  No friction rub  No gallop  Pulmonary:      Effort: Pulmonary effort is normal  No respiratory distress  Breath sounds: Normal breath sounds  No stridor  No wheezing, rhonchi or rales  Abdominal:      General: Abdomen is flat  Palpations: Abdomen is soft  Tenderness: There is generalized abdominal tenderness  There is no guarding or rebound  Musculoskeletal:      Cervical back: Normal range of motion and neck supple  Right lower leg: No edema  Left lower leg: No edema  Skin:     General: Skin is warm and dry  Capillary Refill: Capillary refill takes less than 2 seconds  Neurological:      General: No focal deficit present  Mental Status: She is alert and oriented to person, place, and time     Psychiatric:         Mood and Affect: Mood normal          Behavior: Behavior normal          Vital Signs  ED Triage Vitals [09/07/21 0417]   Temperature Pulse Respirations Blood Pressure SpO2   98 2 °F (36 8 °C) 58 19 153/88 100 %      Temp Source Heart Rate Source Patient Position - Orthostatic VS BP Location FiO2 (%)   Temporal Monitor Sitting Left arm --      Pain Score       8           Vitals:    09/07/21 0417   BP: 153/88   Pulse: 58   Patient Position - Orthostatic VS: Sitting         Visual Acuity      ED Medications  Medications   sodium chloride 0 9 % bolus 1,000 mL (0 mL Intravenous Stopped 9/7/21 0926)   ondansetron (ZOFRAN) injection 4 mg (4 mg Intravenous Given 9/7/21 0559)   ketorolac (TORADOL) injection 15 mg (15 mg Intravenous Given 9/7/21 0559)   diphenhydrAMINE (BENADRYL) injection 25 mg (25 mg Intravenous Given 9/7/21 0632)   metoclopramide (REGLAN) injection 10 mg (10 mg Intravenous Given 9/7/21 0639)   iohexol (OMNIPAQUE) 350 MG/ML injection (SINGLE-DOSE) 100 mL (100 mL Intravenous Given 9/7/21 0659) ketorolac (TORADOL) injection 15 mg (15 mg Intravenous Given 9/7/21 1007)   pantoprazole (PROTONIX) injection 40 mg (40 mg Intravenous Given 9/7/21 1007)       Diagnostic Studies  Results Reviewed     Procedure Component Value Units Date/Time    Comprehensive metabolic panel [[id number]]  (Abnormal) Collected: 09/07/21 0600    Lab Status: Final result Specimen: Blood from Arm, Right Updated: 09/07/21 0621     Sodium 141 mmol/L      Potassium 4 1 mmol/L      Chloride 105 mmol/L      CO2 24 mmol/L      ANION GAP 12 mmol/L      BUN 15 mg/dL      Creatinine 0 98 mg/dL      Glucose 107 mg/dL      Calcium 9 7 mg/dL      AST 18 U/L      ALT 20 U/L      Alkaline Phosphatase 93 U/L      Total Protein 8 7 g/dL      Albumin 4 4 g/dL      Total Bilirubin 0 31 mg/dL      eGFR 86 ml/min/1 73sq m     Narrative:      [place] guidelines for Chronic Kidney Disease (CKD):     Stage 1 with normal or high GFR (GFR > 90 mL/min/1 73 square meters)    Stage 2 Mild CKD (GFR = 60-89 mL/min/1 73 square meters)    Stage 3A Moderate CKD (GFR = 45-59 mL/min/1 73 square meters)    Stage 3B Moderate CKD (GFR = 30-44 mL/min/1 73 square meters)    Stage 4 Severe CKD (GFR = 15-29 mL/min/1 73 square meters)    Stage 5 End Stage CKD (GFR <15 mL/min/1 73 square meters)  Note: GFR calculation is accurate only with a steady state creatinine    Lipase [[id number]]  (Normal) Collected: 09/07/21 0600    Lab Status: Final result Specimen: Blood from Arm, Right Updated: 09/07/21 0621     Lipase 97 u/L     CBC and differential [[id number]]  (Abnormal) Collected: 09/07/21 0559    Lab Status: Final result Specimen: Blood from Arm, Right Updated: 09/07/21 0605     WBC 15 20 Thousand/uL      RBC 4 49 Million/uL      Hemoglobin 13 3 g/dL      Hematocrit 39 8 %      MCV 89 fL      MCH 29 6 pg      MCHC 33 4 g/dL      RDW 11 9 %      MPV 11 8 fL      Platelets 009 Thousands/uL      nRBC 0 /100 WBCs      Neutrophils Relative 83 % Immat GRANS % 1 %      Lymphocytes Relative 12 %      Monocytes Relative 4 %      Eosinophils Relative 0 %      Basophils Relative 0 %      Neutrophils Absolute 12 79 Thousands/µL      Immature Grans Absolute 0 09 Thousand/uL      Lymphocytes Absolute 1 76 Thousands/µL      Monocytes Absolute 0 54 Thousand/µL      Eosinophils Absolute 0 00 Thousand/µL      Basophils Absolute 0 02 Thousands/µL     Stool Enteric Bacterial Panel by PCR [[id number]]     Lab Status: No result Specimen: Stool                  US pelvis complete w transvaginal   Final Result by Marilyn Siddiqi MD (09/07 0944)       Normal thickness of the endometrium   Small foci of calcification noted within the endometrium, nonspecific and without any associated with heterogeneity or abnormal thickening   No pelvic adnexal mass   Small amount of fluid in the pelvis likely physiological               Workstation performed: [id number]         CT abdomen pelvis with contrast   Final Result by Giovanna Mason MD (09/07 1316)      Heterogeneous mural enhancement and prominent fluid-filled endometrial cavity in the inferior right uterus  Recommend correlation with clinical assessment and/or pelvic ultrasound  Workstation performed: [id number]                    Procedures  Procedures         ED Course  ED Course as of Sep 07 2113   Talisha Rack Sep 07, 2021   0914 Patient signed out to [facility] PA-C                                SBIRT 22yo+      Most Recent Value   SBIRT (23 yo +)   In order to provide better care to our patients, we are screening all of our patients for alcohol and drug use  Would it be okay to ask you these screening questions? Yes Filed at: 09/07/2021 0602   Initial Alcohol Screen: US AUDIT-C    1  How often do you have a drink containing alcohol? 1 Filed at: 09/07/2021 0602   2  How many drinks containing alcohol do you have on a typical day you are drinking? 0 Filed at: 09/07/2021 0602   3a  Male UNDER 65:  How often do you have five or more drinks on one occasion? 0 Filed at: 09/07/2021 0602   3b  FEMALE Any Age, or MALE 65+: How often do you have 4 or more drinks on one occassion? 0 Filed at: 09/07/2021 0602   Audit-C Score  1 Filed at: 09/07/2021 0602   ABDIAZIZ: How many times in the past year have you    Used an illegal drug or used a prescription medication for non-medical reasons? Never Filed at: 09/07/2021 0602                    MDM  Number of Diagnoses or Management Options  Nausea and vomiting  Nonspecific abdominal pain  Diagnosis management comments: Patient is a 79-year-old female with a past medical history significant for depression, hypertension who presents to the emergency department for evaluation of abdominal pain, nausea, vomiting, diarrhea that has been ongoing since Saturday  This is patient's 4th visit since Saturday for the same complaints  She has had negative CT scans and blood work previously  Patient states that she has been Zofran Bentyl without relief  She is not any fevers, chills, chest pain, difficulty breathing  Patient denies all other symptoms at this time  Patient appears uncomfortable in bed secondary to pain  She is actively vomiting  She is not in any acute distress  Her vital signs are normal   Physical exam remarkable for generalized abdominal tenderness without rebound or guarding  Heart is regular rate and rhythm  Lungs are clear to auscultation bilaterally  Lab work remarkable for leukocytosis at 15  Patient was feeling significantly improved after Reglan  CT of the abdomen and pelvis revealed a heterogeneous enhancement and prominent fluid-filled endometrial cavity  Pelvic ultrasound was recommended which revealed a small foci of calcification within the endometrium  It is nonspecific  No other acute findings  Patient was signed out to [facility] who was able to discharge patient with GI follow-up  Patient is stable at this time    Patient was given very strict instructions on when to return to the emergency department  Amount and/or Complexity of Data Reviewed  Clinical lab tests: ordered and reviewed  Tests in the radiology section of CPT®: ordered and reviewed    Patient Progress  Patient progress: stable      Disposition  Final diagnoses:   Nonspecific abdominal pain   Nausea and vomiting     Time reflects when diagnosis was documented in both MDM as applicable and the Disposition within this note     Time User Action Codes Description Comment    9/7/2021  9:58 AM Tico Meredith [R10 9] Nonspecific abdominal pain     9/7/2021  9:58 AM Tico Meredith [R11 2] Nausea and vomiting       ED Disposition     ED Disposition Condition Date/Time Comment    Discharge Stable Tue Sep 7, 2021  9:58 AM Elvin Gauthier discharge to home/self care              Follow-up Information     Follow up With Specialties Details Why Contact Info Additional [address], [address] Ike Cespedes Nurse Practitioner Schedule an appointment as soon as possible for a visit   [address] [facility] Gastroenterology Schedule an appointment as soon as possible for a visit   [address]  1121 [facility] [address]  [address] [place] [address] [facility], [address] 917 [facility], [facility], 1717 [facility], [address] [facility] Emergency Medicine  If symptoms worsen 34 [facility] 109 [facility] Emergency Department, 819 [facility], 1717 [facility], [address]          Discharge Medication List as of 9/7/2021 10:01 AM      START taking these medications    Details   ibuprofen (MOTRIN) 800 mg tablet Take 1 tablet (800 mg total) by mouth every 8 (eight) hours as needed for mild pain, Starting Tue 9/7/2021, Normal      metoclopramide (REGLAN) 10 mg tablet Take 1 tablet (10 mg total) by mouth every 6 (six) hours as needed (nausea), Starting Tue 9/7/2021, Normal         CONTINUE these medications which have NOT CHANGED    Details   dicyclomine (BENTYL) 20 mg tablet Take 20 mg by mouth, Starting Sat 9/4/2021, Until Thu 9/9/2021 at 2359, Historical Med      ergocalciferol (VITAMIN D2) 50,000 units Take 1 capsule (50,000 Units total) by mouth once a week, Starting Thu 8/26/2021, Normal      ondansetron (ZOFRAN-ODT) 4 mg disintegrating tablet Take 4 mg by mouth, Starting Sat 9/4/2021, Historical Med      sertraline (ZOLOFT) 50 mg tablet Take 50 mg by mouth daily, Starting Tue 8/17/2021, Until Wed 8/17/2022, Historical Med      triamcinolone (KENALOG) 0 5 % cream Apply topically 2 (two) times a day, Starting Wed 4/7/2021, Normal           No discharge procedures on file      PDMP Review       Value Time User    PDMP Reviewed  Yes 11/8/2019  7:26 AM Roger Kenyon MD          ED Provider  Electronically Signed by           Rowan Cogan, PA-C  09/07/21 1513

## 2021-09-08 ENCOUNTER — TELEMEDICINE (OUTPATIENT)
Dept: FAMILY MEDICINE CLINIC | Facility: CLINIC | Age: 36
End: 2021-09-08
Payer: COMMERCIAL

## 2021-09-08 VITALS — WEIGHT: 145 LBS | HEIGHT: 62 IN | BODY MASS INDEX: 26.68 KG/M2

## 2021-09-08 DIAGNOSIS — F33.1 MODERATE EPISODE OF RECURRENT MAJOR DEPRESSIVE DISORDER (HCC): Primary | ICD-10-CM

## 2021-09-08 DIAGNOSIS — F43.21 UNRESOLVED GRIEF: ICD-10-CM

## 2021-09-08 DIAGNOSIS — R10.84 GENERALIZED ABDOMINAL PAIN: ICD-10-CM

## 2021-09-08 PROCEDURE — 99214 OFFICE O/P EST MOD 30 MIN: CPT | Performed by: NURSE PRACTITIONER

## 2021-09-08 RX ORDER — DICYCLOMINE HCL 20 MG
20 TABLET ORAL EVERY 6 HOURS
Qty: 120 TABLET | Refills: 0 | Status: SHIPPED | OUTPATIENT
Start: 2021-09-08 | End: 2022-03-03 | Stop reason: ALTCHOICE

## 2021-09-08 NOTE — PROGRESS NOTES
Virtual Regular Visit    Verification of patient location:    Patient is located in the following state in which I hold an active license [place]      Assessment/Plan:    Problem List Items Addressed This Visit        Other    Moderate episode of recurrent major depressive disorder ([facility] [address] ) - Primary     Has not started on zoloft, is seeing therapist             Unresolved grief     Currently in counseling at this time  Has not started on any medication  Mentally stable with counseling  She will start the medication if feeling worse  Generalized abdominal pain     Taking Bentyl and Reglan, feeling better today  Has GI pending appointment  If symptoms worsen she is to call our office  Relevant Medications    dicyclomine (BENTYL) 20 mg tablet               Reason for visit is   Chief Complaint   Patient presents with    Follow-up     6 week follow up    Virtual Regular Visit        Encounter provider BARBIE [place]    [facility] [address] [place], [place] [address] [place] [address]  [phone]      Recent Visits  No visits were found meeting these conditions  Showing recent visits within past 7 days and meeting all other requirements  Today's Visits  Date Type Provider Dept   09/08/21 Telemedicine BARBIE [address] Shelia Chaves today's visits and meeting all other requirements  Future Appointments  No visits were found meeting these conditions  Showing future appointments within next 150 days and meeting all other requirements       The patient was identified by name and date of birth  Jeanne Ramirez was informed that this is a telemedicine visit and that the visit is being conducted through [address] [place] [address] Now and patient was informed that this is a secure, HIPAA-compliant platform  She agrees to proceed     My office door was closed  No one else was in the room    She acknowledged consent and understanding of privacy and security of the video platform  The patient has agreed to participate and understands they can discontinue the visit at any time  Patient is aware this is a billable service  Subjective  Haroon James is a 39 y o  female who presents today for follow up, has know depression and anxiety, had recent hospitalization in [facility]   Has three sessions with Therapy  mentally doing better  Recent three visits, main on 9/4 for abdominal pain, CT scan done  received bentyl with no relief at that time  She then went to  [facility] on 9/6 and [facility] on 9/7, she had a ct scan of abd/pelvis with contrast which did show some pelvic fluid  US of pelvic completed, Normal, taking bentyl with some better relief at this time  She reports eating a bland diet, taking Reglan  No vomiting, no nausea, no diarrhea today  [place]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     Past Medical History:   Diagnosis Date    Depression     Hypertension     with pregnancy    Pre-eclampsia        Past Surgical History:   Procedure Laterality Date    KNEE ARTHROSCOPY         Current Outpatient Medications   Medication Sig Dispense Refill    dicyclomine (BENTYL) 20 mg tablet Take 1 tablet (20 mg total) by mouth every 6 (six) hours 120 tablet 0    ergocalciferol (VITAMIN D2) 50,000 units Take 1 capsule (50,000 Units total) by mouth once a week 12 capsule 1    ibuprofen (MOTRIN) 800 mg tablet Take 1 tablet (800 mg total) by mouth every 8 (eight) hours as needed for mild pain 21 tablet 0    metoclopramide (REGLAN) 10 mg tablet Take 1 tablet (10 mg total) by mouth every 6 (six) hours as needed (nausea) 30 tablet 0    ondansetron (ZOFRAN-ODT) 4 mg disintegrating tablet Take 4 mg by mouth      sertraline (ZOLOFT) 50 mg tablet Take 50 mg by mouth daily      triamcinolone (KENALOG) 0 5 % cream Apply topically 2 (two) times a day (Patient not taking: Reported on 8/5/2021) 30 g 3     No current facility-administered medications for this visit          Allergies   Allergen Reactions    Other Other (See Comments)     Shrimp    Pepcid [Famotidine]     Shellfish-Derived Products - Food Allergy        Review of Systems   Constitutional: Negative for activity change, chills, fatigue and fever  HENT: Negative for congestion, ear discharge, ear pain, sinus pressure, sinus pain, sore throat, tinnitus and trouble swallowing  Eyes: Negative for photophobia, pain, discharge, itching and visual disturbance  Respiratory: Negative for cough, chest tightness, shortness of breath and wheezing  Cardiovascular: Negative for chest pain and leg swelling  Gastrointestinal: Negative for abdominal distention, abdominal pain, constipation, diarrhea, nausea and vomiting  Endocrine: Negative for polydipsia, polyphagia and polyuria  Genitourinary: Negative for dysuria and frequency  Musculoskeletal: Negative for arthralgias, myalgias, neck pain and neck stiffness  Skin: Negative for color change  Neurological: Negative for dizziness, syncope, weakness, numbness and headaches  Hematological: Does not bruise/bleed easily  Psychiatric/Behavioral: Negative for behavioral problems, confusion, self-injury, sleep disturbance and suicidal ideas  The patient is not nervous/anxious  Video Exam    Vitals:    09/08/21 1253   Weight: 65 8 kg (145 lb)   Height: 5' 2" (1 575 m)       Physical Exam  Constitutional:       Appearance: Normal appearance  Pulmonary:      Effort: Pulmonary effort is normal    Neurological:      General: No focal deficit present  Mental Status: She is alert and oriented to person, place, and time  Psychiatric:         Mood and Affect: Mood normal          Behavior: Behavior normal           I spent 15 minutes with patient today in which greater than 50% of the time was spent in counseling/coordination of care regarding treatment plan     VIRTUAL VISIT [address] verbally agrees to participate in [employer]   Pt is aware that Virtual Care Services could be limited without vital signs or the ability to perform a full hands-on physical exam  Giveline Maile Schilder understands she or the provider may request at any time to terminate the video visit and request the patient to seek care or treatment in person

## 2021-09-08 NOTE — ASSESSMENT & PLAN NOTE
Taking Bentyl and Reglan, feeling better today  Has GI pending appointment  If symptoms worsen she is to call our office

## 2021-09-08 NOTE — ASSESSMENT & PLAN NOTE
Currently in counseling at this time  Has not started on any medication  Mentally stable with counseling  She will start the medication if feeling worse

## 2021-10-29 ENCOUNTER — OFFICE VISIT (OUTPATIENT)
Dept: URGENT CARE | Facility: CLINIC | Age: 36
End: 2021-10-29
Payer: COMMERCIAL

## 2021-10-29 VITALS
RESPIRATION RATE: 16 BRPM | OXYGEN SATURATION: 98 % | SYSTOLIC BLOOD PRESSURE: 122 MMHG | HEART RATE: 65 BPM | DIASTOLIC BLOOD PRESSURE: 84 MMHG | TEMPERATURE: 97.7 F

## 2021-10-29 DIAGNOSIS — M62.838 MUSCLE SPASMS OF NECK: Primary | ICD-10-CM

## 2021-10-29 PROCEDURE — 99213 OFFICE O/P EST LOW 20 MIN: CPT | Performed by: PHYSICIAN ASSISTANT

## 2021-10-29 RX ORDER — CYCLOBENZAPRINE HCL 10 MG
10 TABLET ORAL 3 TIMES DAILY PRN
Qty: 21 TABLET | Refills: 0 | Status: SHIPPED | OUTPATIENT
Start: 2021-10-29 | End: 2022-03-03 | Stop reason: ALTCHOICE

## 2021-12-22 ENCOUNTER — CLINICAL SUPPORT (OUTPATIENT)
Dept: FAMILY MEDICINE CLINIC | Facility: CLINIC | Age: 36
End: 2021-12-22

## 2021-12-22 DIAGNOSIS — Z20.822 CLOSE EXPOSURE TO COVID-19 VIRUS: Primary | ICD-10-CM

## 2021-12-22 DIAGNOSIS — R05.9 COUGH: Primary | ICD-10-CM

## 2021-12-22 PROCEDURE — 87636 SARSCOV2 & INF A&B AMP PRB: CPT | Performed by: NURSE PRACTITIONER

## 2021-12-24 LAB
FLUAV RNA RESP QL NAA+PROBE: NEGATIVE
FLUBV RNA RESP QL NAA+PROBE: NEGATIVE
SARS-COV-2 RNA RESP QL NAA+PROBE: POSITIVE

## 2021-12-28 ENCOUNTER — TELEMEDICINE (OUTPATIENT)
Dept: FAMILY MEDICINE CLINIC | Facility: CLINIC | Age: 36
End: 2021-12-28
Payer: COMMERCIAL

## 2021-12-28 DIAGNOSIS — U07.1 COVID-19 VIRUS INFECTION: Primary | ICD-10-CM

## 2021-12-28 PROCEDURE — 99213 OFFICE O/P EST LOW 20 MIN: CPT | Performed by: NURSE PRACTITIONER

## 2022-01-18 ENCOUNTER — OFFICE VISIT (OUTPATIENT)
Dept: FAMILY MEDICINE CLINIC | Facility: CLINIC | Age: 37
End: 2022-01-18
Payer: COMMERCIAL

## 2022-01-18 VITALS
WEIGHT: 137.2 LBS | HEART RATE: 88 BPM | TEMPERATURE: 97.3 F | OXYGEN SATURATION: 98 % | SYSTOLIC BLOOD PRESSURE: 122 MMHG | DIASTOLIC BLOOD PRESSURE: 64 MMHG | RESPIRATION RATE: 18 BRPM | BODY MASS INDEX: 25.25 KG/M2 | HEIGHT: 62 IN

## 2022-01-18 DIAGNOSIS — Z00.00 ENCOUNTER FOR WELL ADULT EXAM WITHOUT ABNORMAL FINDINGS: Primary | ICD-10-CM

## 2022-01-18 PROCEDURE — 99395 PREV VISIT EST AGE 18-39: CPT | Performed by: NURSE PRACTITIONER

## 2022-01-18 NOTE — PROGRESS NOTES
ADULT ANNUAL [address]    NAME: Tierra Araujo  AGE: 39 y o  SEX: female  : 1985     DATE: 2022     Assessment and Plan:     Problem List Items Addressed This Visit     None      Visit Diagnoses     Encounter for well adult exam without abnormal findings    -  Primary          Immunizations and preventive care screenings were discussed with patient today  Appropriate education was printed on patient's after visit summary  Counseling:  Alcohol/drug use: discussed moderation in alcohol intake, the recommendations for healthy alcohol use, and avoidance of illicit drug use  Dental Health: discussed importance of regular tooth brushing, flossing, and dental visits  Injury prevention: discussed safety/seat belts, safety helmets, smoke detectors, carbon dioxide detectors, and smoking near bedding or upholstery  Sexual health: discussed sexually transmitted diseases, partner selection, use of condoms, avoidance of unintended pregnancy, and contraceptive alternatives  · Exercise: the importance of regular exercise/physical activity was discussed  Recommend exercise 3-5 times per week for at least 30 minutes  BMI Counseling: Body mass index is 25 09 kg/m²  The BMI is above normal  Nutrition recommendations include decreasing portion sizes  Exercise recommendations include moderate physical activity 150 minutes/week  Rationale for BMI follow-up plan is due to patient being overweight or obese  Return in about 1 year (around 2023)  Chief Complaint:     Chief Complaint   Patient presents with    Physical Exam      History of Present Illness:     Adult Annual Physical   Patient here for a comprehensive physical exam  The patient reports no problems  Diet and Physical Activity  · Diet/Nutrition: well balanced diet  · Exercise: moderate cardiovascular exercise        Depression Screening  PHQ-2/9 Depression Screening    Little interest or pleasure in doing things: 1 - several days  Feeling down, depressed, or hopeless: 1 - several days  Trouble falling or staying asleep, or sleeping too much: 1 - several days  Feeling tired or having little energy: 1 - several days  Poor appetite or overeatin - several days  Feeling bad about yourself - or that you are a failure or have let yourself or your family down: 1 - several days  Trouble concentrating on things, such as reading the newspaper or watching television: 1 - several days  Moving or speaking so slowly that other people could have noticed  Or the opposite - being so fidgety or restless that you have been moving around a lot more than usual: 0 - not at all  Thoughts that you would be better off dead, or of hurting yourself in some way: 0 - not at all       [address] [place] [address]  · Sleep: sleeps well  · Hearing: normal - bilateral   · Vision: wears glasses  · Dental: regular dental visits  /GYN Health  · Last menstrual period: regular   · Contraceptive method: none  · History of STDs?: no      Review of Systems:     Review of Systems   Constitutional: Negative for activity change, chills, fatigue and fever  HENT: Negative for congestion, ear discharge, ear pain, sinus pressure, sinus pain, sore throat, tinnitus and trouble swallowing  Eyes: Negative for photophobia, pain, discharge, itching and visual disturbance  Respiratory: Negative for cough, chest tightness, shortness of breath and wheezing  Cardiovascular: Negative for chest pain and leg swelling  Gastrointestinal: Negative for abdominal distention, abdominal pain, constipation, diarrhea, nausea and vomiting  Endocrine: Negative for polydipsia, polyphagia and polyuria  Genitourinary: Negative for dysuria and frequency  Musculoskeletal: Negative for arthralgias, myalgias, neck pain and neck stiffness  Skin: Negative for color change     Neurological: Negative for dizziness, syncope, weakness, numbness and headaches  Hematological: Does not bruise/bleed easily  Psychiatric/Behavioral: Negative for behavioral problems, confusion, self-injury, sleep disturbance and suicidal ideas  The patient is not nervous/anxious         Past Medical History:     Past Medical History:   Diagnosis Date    Depression     Hypertension     with pregnancy    Pre-eclampsia       Past Surgical History:     Past Surgical History:   Procedure Laterality Date    KNEE ARTHROSCOPY        Social History:     Social History     Socioeconomic History    Marital status: Single     Spouse name: None    Number of children: None    Years of education: None    Highest education level: None   Occupational History    None   Tobacco Use    Smoking status: Never Smoker    Smokeless tobacco: Never Used    Tobacco comment: smokes 1 cigarette "socially"    Vaping Use    Vaping Use: Never used   Substance and Sexual Activity    Alcohol use: Yes     Comment: socially    Drug use: Never    Sexual activity: None   Other Topics Concern    None   Social History Narrative    None     Social Determinants of Health     Financial Resource Strain: Not on file   Food Insecurity: Not on file   Transportation Needs: Not on file   Physical Activity: Not on file   Stress: Not on file   Social Connections: Not on file   Intimate Partner Violence: Not on file   Housing Stability: Not on file      Family History:     Family History   Problem Relation Age of Onset    Asthma Mother     Anemia Mother     Arthritis Mother       Current Medications:     Current Outpatient Medications   Medication Sig Dispense Refill    ergocalciferol (VITAMIN D2) 50,000 units Take 1 capsule (50,000 Units total) by mouth once a week 12 capsule 1    ibuprofen (MOTRIN) 800 mg tablet Take 1 tablet (800 mg total) by mouth every 8 (eight) hours as needed for mild pain 21 tablet 0    cyclobenzaprine (FLEXERIL) 10 mg tablet Take 1 tablet (10 mg total) by mouth 3 (three) times a day as needed for muscle spasms for up to 7 days 21 tablet 0    dicyclomine (BENTYL) 20 mg tablet Take 1 tablet (20 mg total) by mouth every 6 (six) hours 120 tablet 0    metoclopramide (REGLAN) 10 mg tablet Take 1 tablet (10 mg total) by mouth every 6 (six) hours as needed (nausea) (Patient not taking: Reported on 12/28/2021 ) 30 tablet 0    ondansetron (ZOFRAN-ODT) 4 mg disintegrating tablet Take 4 mg by mouth (Patient not taking: Reported on 12/28/2021 )      triamcinolone (KENALOG) 0 5 % cream Apply topically 2 (two) times a day (Patient not taking: Reported on 8/5/2021) 30 g 3     No current facility-administered medications for this visit  Allergies: Allergies   Allergen Reactions    Other Other (See Comments)     Shrimp    Pepcid [Famotidine]     Shellfish-Derived Products - Food Allergy       Physical Exam:     /64 (BP Location: Left arm, Patient Position: Sitting)   Pulse 88   Temp (!) 97 3 °F (36 3 °C)   Resp 18   Ht 5' 2" (1 575 m)   Wt 62 2 kg (137 lb 3 2 oz)   SpO2 98%   BMI 25 09 kg/m²     Physical Exam  Constitutional:       Appearance: Normal appearance  She is well-developed  Cardiovascular:      Rate and Rhythm: Normal rate and regular rhythm  Pulmonary:      Effort: Pulmonary effort is normal       Breath sounds: Normal breath sounds  Neurological:      General: No focal deficit present  Mental Status: She is alert and oriented to person, place, and time  Psychiatric:         Mood and Affect: Mood normal          Behavior: Behavior normal          Thought Content: Thought content normal          Judgment: Judgment normal           BARBIE Coker BMI Counseling: Body mass index is 25 09 kg/m²  The BMI is above normal  Nutrition recommendations include reducing portion sizes  Exercise recommendations include moderate aerobic physical activity for 150 minutes/week    R Heidy Rodriguez [address]

## 2022-03-03 ENCOUNTER — OFFICE VISIT (OUTPATIENT)
Dept: FAMILY MEDICINE CLINIC | Facility: CLINIC | Age: 37
End: 2022-03-03
Payer: COMMERCIAL

## 2022-03-03 VITALS
HEIGHT: 62 IN | BODY MASS INDEX: 25.21 KG/M2 | OXYGEN SATURATION: 98 % | SYSTOLIC BLOOD PRESSURE: 120 MMHG | TEMPERATURE: 98.9 F | HEART RATE: 90 BPM | DIASTOLIC BLOOD PRESSURE: 70 MMHG | WEIGHT: 137 LBS

## 2022-03-03 DIAGNOSIS — F41.9 ANXIETY: ICD-10-CM

## 2022-03-03 DIAGNOSIS — Z28.04: Primary | ICD-10-CM

## 2022-03-03 DIAGNOSIS — F33.1 MODERATE EPISODE OF RECURRENT MAJOR DEPRESSIVE DISORDER (HCC): ICD-10-CM

## 2022-03-03 PROCEDURE — 99214 OFFICE O/P EST MOD 30 MIN: CPT | Performed by: NURSE PRACTITIONER

## 2022-03-03 RX ORDER — TRAZODONE HYDROCHLORIDE 50 MG/1
TABLET ORAL
COMMUNITY
Start: 2022-02-17 | End: 2022-07-12 | Stop reason: ALTCHOICE

## 2022-03-03 RX ORDER — NORELGESTROMIN AND ETHINYL ESTRADIOL 150; 35 UG/D; UG/D
PATCH TRANSDERMAL
COMMUNITY
Start: 2022-02-11 | End: 2022-07-12 | Stop reason: ALTCHOICE

## 2022-03-03 NOTE — ASSESSMENT & PLAN NOTE
Has increased therapy sessions to twice weekly due to the anniversary of her son's death on Saturday, he had past 6 years ago  She is taking trazodone 50 mg   Stable at current time

## 2022-03-03 NOTE — ASSESSMENT & PLAN NOTE
Stable at this time, receiving counseling and furthering her career    Recent new job and doing well

## 2022-03-03 NOTE — PROGRESS NOTES
OFFICE VISIT  Christiano Hutchison 39 y o  female MRN: [id number]          Assessment / Plan:  Problem List Items Addressed This Visit        Other    Moderate episode of recurrent major depressive disorder ([address] )     Has increased therapy sessions to twice weekly due to the anniversary of her son's death on Saturday, he had past 6 years ago  She is taking trazodone 50 mg  Stable at current time         Relevant Medications    traZODone (DESYREL) 50 mg tablet    Anxiety     Stable at this time, receiving counseling and furthering her career  Recent new job and doing well         Immunization not carried out because of allergy to vaccine or component - Primary     Form completed                 Reason For Visit / Chief Complaint  Chief Complaint   Patient presents with    Follow-up     need note for refusing flu shot        HPI:  Christiano Hutchison is a 39 y o  female who is here today for form completion  She has had prior reaction to two flu shots in the past  She is working for a new company and needing medical form completed  She has known depression, anxiety, stemming from the loss of her son  She is establish with counseling and overall doing well      Historical Information   Past Medical History:   Diagnosis Date    Depression     Hypertension     with pregnancy    Pre-eclampsia      Past Surgical History:   Procedure Laterality Date    KNEE ARTHROSCOPY       Social History   Social History     Substance and Sexual Activity   Alcohol Use Yes    Comment: socially     Social History     Substance and Sexual Activity   Drug Use Never     Social History     Tobacco Use   Smoking Status Never Smoker   Smokeless Tobacco Never Used   Tobacco Comment    smokes 1 cigarette "socially"      Family History   Problem Relation Age of Onset    Asthma Mother     Anemia Mother     Arthritis Mother        Meds/Allergies   Allergies   Allergen Reactions    Other Other (See Comments)     Shrimp    Pepcid [Famotidine]     Shellfish-Derived Products - Food Allergy        Meds:    Current Outpatient Medications:     ergocalciferol (VITAMIN D2) 50,000 units, Take 1 capsule (50,000 Units total) by mouth once a week, Disp: 12 capsule, Rfl: 1    ibuprofen (MOTRIN) 800 mg tablet, Take 1 tablet (800 mg total) by mouth every 8 (eight) hours as needed for mild pain, Disp: 21 tablet, Rfl: 0    traZODone (DESYREL) 50 mg tablet, TAKE ONE HALF (0 5) TABLETS TO ONE FULL TABLET EVERY DAY AT BEDTIME, Disp: , Rfl:     Xulane 150-35 MCG/24HR, APPLY 1 PATCH ONE TIME PER WEEK, Disp: , Rfl:       REVIEW OF SYSTEMS  Review of Systems   Constitutional: Negative for activity change, chills, fatigue and fever  HENT: Negative for congestion, ear discharge, ear pain, sinus pressure, sinus pain, sore throat, tinnitus and trouble swallowing  Eyes: Negative for photophobia, pain, discharge, itching and visual disturbance  Respiratory: Negative for cough, chest tightness, shortness of breath and wheezing  Cardiovascular: Negative for chest pain and leg swelling  Gastrointestinal: Negative for abdominal distention, abdominal pain, constipation, diarrhea, nausea and vomiting  Endocrine: Negative for polydipsia, polyphagia and polyuria  Genitourinary: Negative for dysuria and frequency  Musculoskeletal: Negative for arthralgias, myalgias, neck pain and neck stiffness  Skin: Negative for color change  Neurological: Negative for dizziness, syncope, weakness, numbness and headaches  Hematological: Does not bruise/bleed easily  Psychiatric/Behavioral: Negative for behavioral problems, confusion, self-injury, sleep disturbance and suicidal ideas  The patient is not nervous/anxious              Current Vitals:   Blood Pressure: 120/70 (03/03/22 1018)  Pulse: 90 (03/03/22 1018)  Temperature: 98 9 °F (37 2 °C) (03/03/22 1018)  Height: 5' 2" (157 5 cm) (03/03/22 1018)  Weight - Scale: 62 1 kg (137 lb) (03/03/22 1018)  SpO2: 98 % (03/03/22 1018)  [unfilled]    PHYSICAL EXAMS:  Physical Exam  Vitals and nursing note reviewed  Constitutional:       Appearance: Normal appearance  She is well-developed  HENT:      Head: Normocephalic and atraumatic  Cardiovascular:      Rate and Rhythm: Normal rate and regular rhythm  Pulses: Normal pulses  Heart sounds: Normal heart sounds  Pulmonary:      Effort: Pulmonary effort is normal       Breath sounds: Normal breath sounds  Neurological:      General: No focal deficit present  Mental Status: She is alert and oriented to person, place, and time  Psychiatric:         Mood and Affect: Mood normal          Behavior: Behavior normal          Thought Content: Thought content normal          Judgment: Judgment normal              Lab, imaging and other studies: I have personally reviewed pertinent reports  Danisha Ren

## 2022-07-12 ENCOUNTER — OFFICE VISIT (OUTPATIENT)
Dept: FAMILY MEDICINE CLINIC | Facility: CLINIC | Age: 37
End: 2022-07-12
Payer: COMMERCIAL

## 2022-07-12 VITALS
HEART RATE: 65 BPM | BODY MASS INDEX: 25.06 KG/M2 | DIASTOLIC BLOOD PRESSURE: 84 MMHG | OXYGEN SATURATION: 99 % | SYSTOLIC BLOOD PRESSURE: 122 MMHG | WEIGHT: 136.2 LBS | HEIGHT: 62 IN | TEMPERATURE: 98.3 F

## 2022-07-12 DIAGNOSIS — F41.9 ANXIETY: ICD-10-CM

## 2022-07-12 DIAGNOSIS — K58.0 IRRITABLE BOWEL SYNDROME WITH DIARRHEA: ICD-10-CM

## 2022-07-12 DIAGNOSIS — F33.1 MODERATE EPISODE OF RECURRENT MAJOR DEPRESSIVE DISORDER (HCC): Primary | ICD-10-CM

## 2022-07-12 PROCEDURE — 99214 OFFICE O/P EST MOD 30 MIN: CPT | Performed by: NURSE PRACTITIONER

## 2022-07-12 RX ORDER — DICYCLOMINE HCL 20 MG
20 TABLET ORAL EVERY 6 HOURS
Qty: 90 TABLET | Refills: 1 | Status: SHIPPED | OUTPATIENT
Start: 2022-07-12

## 2022-07-12 RX ORDER — DICYCLOMINE HCL 20 MG
20 TABLET ORAL EVERY 6 HOURS
COMMUNITY
End: 2022-07-12 | Stop reason: SDUPTHER

## 2022-07-12 NOTE — LETTER
July 12, 2022     Patient: Olman Eugene  YOB: 1985  Date of Visit: 7/12/2022      To Whom it May Concern:    Olman Eugene is under my professional care  Elías Urrutia was seen in my office on 7/12/2022  Elías Urrutia may return to work on 7/13/22  If you have any questions or concerns, please don't hesitate to call           Sincerely,          BARBIE Gonzalez        CC: No Recipients

## 2022-07-12 NOTE — PROGRESS NOTES
OFFICE VISIT  Joel Lujan 40 y o  female MRN: [id number]          Assessment / Plan:  Problem List Items Addressed This Visit        Digestive    Irritable bowel syndrome with diarrhea     Similar episode as prev work up in ED  Will resume bentyl, if symptoms persist or continue call office  Relevant Medications    dicyclomine (BENTYL) 20 mg tablet       Other    Moderate episode of recurrent major depressive disorder ([facility] [address] ) - Primary     Stable at this time, on no medication  Remains in counseling  Anxiety     Recent transition of jobs,   Is est with mental health therapist                     Reason For Visit / Chief Complaint  Chief Complaint   Patient presents with    Abdominal Pain     Stomach cramps for 3 days   Worsened today         HPI:  Joel Lujan is a 40 y o  female who presents today for stomach cramping  She reports abd cramping, over the last few days  Had same occurence last year  She reports taking a bentyl pill in which it was relieved  She reports diarrea  NO fever or chills  Pain is improving         Historical Information   Past Medical History:   Diagnosis Date    Depression     Hypertension     with pregnancy    Pre-eclampsia      Past Surgical History:   Procedure Laterality Date    KNEE ARTHROSCOPY       Social History   Social History     Substance and Sexual Activity   Alcohol Use Yes    Comment: socially     Social History     Substance and Sexual Activity   Drug Use Never     Social History     Tobacco Use   Smoking Status Never Smoker   Smokeless Tobacco Never Used   Tobacco Comment    smokes 1 cigarette "socially"      Family History   Problem Relation Age of Onset    Asthma Mother     Anemia Mother     Arthritis Mother        Meds/Allergies   Allergies   Allergen Reactions    Other Other (See Comments)     Shrimp    Pepcid [Famotidine]     Shellfish-Derived Products - Food Allergy        Meds:    Current Outpatient Medications:     dicyclomine (BENTYL) 20 mg tablet, Take 1 tablet (20 mg total) by mouth every 6 (six) hours, Disp: 90 tablet, Rfl: 1    ergocalciferol (VITAMIN D2) 50,000 units, Take 1 capsule (50,000 Units total) by mouth once a week, Disp: 12 capsule, Rfl: 1    ibuprofen (MOTRIN) 800 mg tablet, Take 1 tablet (800 mg total) by mouth every 8 (eight) hours as needed for mild pain, Disp: 21 tablet, Rfl: 0      REVIEW OF SYSTEMS  Review of Systems   Constitutional: Negative for activity change, chills, fatigue and fever  HENT: Negative for congestion, ear discharge, ear pain, sinus pressure, sinus pain, sore throat, tinnitus and trouble swallowing  Eyes: Negative for photophobia, pain, discharge, itching and visual disturbance  Respiratory: Negative for cough, chest tightness, shortness of breath and wheezing  Cardiovascular: Negative for chest pain and leg swelling  Gastrointestinal: Positive for abdominal pain and diarrhea  Negative for abdominal distention, constipation, nausea and vomiting  Endocrine: Negative for polydipsia, polyphagia and polyuria  Genitourinary: Negative for dysuria and frequency  Musculoskeletal: Negative for arthralgias, myalgias, neck pain and neck stiffness  Skin: Negative for color change  Neurological: Negative for dizziness, syncope, weakness, numbness and headaches  Hematological: Does not bruise/bleed easily  Psychiatric/Behavioral: Negative for behavioral problems, confusion, self-injury, sleep disturbance and suicidal ideas  The patient is not nervous/anxious  Current Vitals:   Blood Pressure: 122/84 (07/12/22 1222)  Pulse: 65 (07/12/22 1222)  Temperature: 98 3 °F (36 8 °C) (07/12/22 1222)  Temp Source: Tympanic (07/12/22 1222)  Height: 5' 2" (157 5 cm) (07/12/22 1222)  Weight - Scale: 61 8 kg (136 lb 3 2 oz) (07/12/22 1222)  SpO2: 99 % (07/12/22 1222)  [unfilled]    PHYSICAL EXAMS:  Physical Exam  Vitals and nursing note reviewed     Constitutional:       Appearance: She is well-developed  HENT:      Head: Normocephalic and atraumatic  Cardiovascular:      Rate and Rhythm: Normal rate and regular rhythm  Heart sounds: Normal heart sounds  Pulmonary:      Effort: Pulmonary effort is normal       Breath sounds: Normal breath sounds  Abdominal:      General: Bowel sounds are normal       Tenderness: There is abdominal tenderness  Hernia: No hernia is present  Genitourinary:     Adnexa: Right adnexa normal    Skin:     General: Skin is warm  Neurological:      General: No focal deficit present  Mental Status: She is alert  Psychiatric:         Behavior: Behavior normal              Lab, imaging and other studies: I have personally reviewed pertinent reports  Leonarda Chen

## 2022-07-12 NOTE — ASSESSMENT & PLAN NOTE
Similar episode as prev work up in ED  Will resume bentyl, if symptoms persist or continue call office

## 2022-08-12 DIAGNOSIS — M54.41 CHRONIC BILATERAL LOW BACK PAIN WITH RIGHT-SIDED SCIATICA: Primary | ICD-10-CM

## 2022-08-12 DIAGNOSIS — G89.29 CHRONIC BILATERAL LOW BACK PAIN WITH RIGHT-SIDED SCIATICA: Primary | ICD-10-CM

## 2022-08-12 RX ORDER — CYCLOBENZAPRINE HCL 10 MG
10 TABLET ORAL 3 TIMES DAILY PRN
Qty: 90 TABLET | Refills: 1 | Status: SHIPPED | OUTPATIENT
Start: 2022-08-12 | End: 2022-10-25

## 2022-08-26 ENCOUNTER — OFFICE VISIT (OUTPATIENT)
Dept: URGENT CARE | Facility: CLINIC | Age: 37
End: 2022-08-26
Payer: COMMERCIAL

## 2022-08-26 VITALS
SYSTOLIC BLOOD PRESSURE: 121 MMHG | RESPIRATION RATE: 18 BRPM | TEMPERATURE: 98.9 F | OXYGEN SATURATION: 98 % | DIASTOLIC BLOOD PRESSURE: 83 MMHG | HEART RATE: 84 BPM

## 2022-08-26 DIAGNOSIS — R55 SYNCOPE, UNSPECIFIED SYNCOPE TYPE: Primary | ICD-10-CM

## 2022-08-26 LAB
SL AMB  POCT GLUCOSE, UA: NORMAL
SL AMB LEUKOCYTE ESTERASE,UA: NORMAL
SL AMB POCT BILIRUBIN,UA: NORMAL
SL AMB POCT BLOOD,UA: NORMAL
SL AMB POCT CLARITY,UA: CLEAR
SL AMB POCT COLOR,UA: YELLOW
SL AMB POCT KETONES,UA: NORMAL
SL AMB POCT NITRITE,UA: NORMAL
SL AMB POCT PH,UA: 7
SL AMB POCT SPECIFIC GRAVITY,UA: 1.01
SL AMB POCT URINE HCG: NORMAL
SL AMB POCT URINE PROTEIN: NORMAL
SL AMB POCT UROBILINOGEN: 0.2

## 2022-08-26 PROCEDURE — 81025 URINE PREGNANCY TEST: CPT

## 2022-08-26 PROCEDURE — 81002 URINALYSIS NONAUTO W/O SCOPE: CPT

## 2022-08-26 PROCEDURE — 93005 ELECTROCARDIOGRAM TRACING: CPT

## 2022-08-26 PROCEDURE — 99213 OFFICE O/P EST LOW 20 MIN: CPT

## 2022-08-26 NOTE — PROGRESS NOTES
[address]        NAME: Cuba Garrett is a 40 y o  female  : 1985    MRN: [id number]  DATE: 2022  TIME: 7:26 PM    Assessment and Plan   Syncope, unspecified syncope type [R55]  1  Syncope, unspecified syncope type  ECG 12 lead     Normal exam, fatigue/pallor improved  She was able to walk to and from the bathroom without assistance  Urine pregnancy negative  EKG with sinus arrythmia, unchanged from last EKG on file  Discussed going to the ER  She does not want to go at this time  Due to this and likely stress factor/lack of food drink today causing symptom okay to go home   will be driving  He is a RN and will be watching her  I walked her to the car and she was able to walk with only very mild dizziness  Explained to  and her that she strictly needs to go the ER with any worsening symptoms and especially if she passes out  Follow up with primary care in 3-5 days  Go to ER if symptoms get worse  Patient Instructions       Follow up with PCP in 3-5 days  Proceed to ER if symptoms worsen  Chief Complaint   No chief complaint on file  History of Present Illness       Presents to urgent care after passing out this morning  While arrived to urgent care she was faint and went into the wheelchair  She has a history of a fib during pregnancy  Associated nausea and fatigue  She reports stress, anxiety, depression have been worse this week  Denies SI  It is close to back to school which has her stressed  She also has been working overtime in understaffed situations plus limited sleep due to children  Also, barely ate/drank anything today due to being busy at work  Had vertigo in the past and dizziness  Review of Systems   Review of Systems   Constitutional: Positive for fatigue  Negative for chills and fever  HENT: Negative for congestion and sore throat  Respiratory: Negative for cough, shortness of breath and wheezing      Cardiovascular: Negative for chest pain  Gastrointestinal: Positive for nausea  Negative for abdominal pain  Genitourinary: Negative for dysuria  Musculoskeletal: Negative for myalgias  Skin: Positive for pallor  Neurological: Positive for syncope and light-headedness  Negative for dizziness  Psychiatric/Behavioral: Negative for confusion  Current Medications       Current Outpatient Medications:     cyclobenzaprine (FLEXERIL) 10 mg tablet, Take 1 tablet (10 mg total) by mouth 3 (three) times a day as needed for muscle spasms, Disp: 90 tablet, Rfl: 1    dicyclomine (BENTYL) 20 mg tablet, Take 1 tablet (20 mg total) by mouth every 6 (six) hours, Disp: 90 tablet, Rfl: 1    ergocalciferol (VITAMIN D2) 50,000 units, Take 1 capsule (50,000 Units total) by mouth once a week, Disp: 12 capsule, Rfl: 1    ibuprofen (MOTRIN) 800 mg tablet, Take 1 tablet (800 mg total) by mouth every 8 (eight) hours as needed for mild pain, Disp: 21 tablet, Rfl: 0    Current Allergies     Allergies as of 08/26/2022 - Reviewed 07/12/2022   Allergen Reaction Noted    Other Other (See Comments) 09/04/2021    Pepcid [famotidine]  11/08/2019    Shellfish-derived products - food allergy  11/08/2019            The following portions of the patient's history were reviewed and updated as appropriate: allergies, current medications, past family history, past medical history, past social history, past surgical history and problem list      Past Medical History:   Diagnosis Date    Depression     Hypertension     with pregnancy    Pre-eclampsia        Past Surgical History:   Procedure Laterality Date    KNEE ARTHROSCOPY         Family History   Problem Relation Age of Onset    Asthma Mother     Anemia Mother     Arthritis Mother          Medications have been verified  Objective   There were no vitals taken for this visit  Physical Exam     Physical Exam  Vitals reviewed     Constitutional:       Appearance: Normal appearance  Comments: Fatigued appearring   HENT:      Right Ear: Tympanic membrane, ear canal and external ear normal       Left Ear: Tympanic membrane, ear canal and external ear normal       Nose: Nose normal       Mouth/Throat:      Mouth: Mucous membranes are moist       Pharynx: No posterior oropharyngeal erythema  Eyes:      Conjunctiva/sclera: Conjunctivae normal    Cardiovascular:      Rate and Rhythm: Normal rate  Rhythm irregular  Pulses: Normal pulses  Heart sounds: Normal heart sounds  No murmur heard  Pulmonary:      Effort: Pulmonary effort is normal  No respiratory distress  Breath sounds: Normal breath sounds  Abdominal:      General: Bowel sounds are normal  There is no distension  Palpations: Abdomen is soft  There is no mass  Tenderness: There is no abdominal tenderness  There is no guarding or rebound  Musculoskeletal:         General: Normal range of motion  Skin:     General: Skin is warm and dry  Capillary Refill: Capillary refill takes less than 2 seconds  Neurological:      General: No focal deficit present  Mental Status: She is alert and oriented to person, place, and time     Psychiatric:         Mood and Affect: Mood normal          Behavior: Behavior normal

## 2022-08-26 NOTE — PATIENT INSTRUCTIONS
BP and heart rate normal   EKG result is unchanged from normal   Eat and drink plenty of fluids  If pass out again, dizziness worsens or unable to tolerate fluids go straight to ER

## 2022-08-30 LAB
ATRIAL RATE: 77 BPM
P AXIS: 51 DEGREES
PR INTERVAL: 138 MS
QRS AXIS: 43 DEGREES
QRSD INTERVAL: 74 MS
QT INTERVAL: 340 MS
QTC INTERVAL: 384 MS
T WAVE AXIS: 33 DEGREES
VENTRICULAR RATE: 77 BPM

## 2022-08-30 PROCEDURE — 93010 ELECTROCARDIOGRAM REPORT: CPT | Performed by: INTERNAL MEDICINE

## 2022-09-26 ENCOUNTER — OFFICE VISIT (OUTPATIENT)
Dept: FAMILY MEDICINE CLINIC | Facility: CLINIC | Age: 37
End: 2022-09-26
Payer: COMMERCIAL

## 2022-09-26 VITALS — TEMPERATURE: 98.4 F | HEART RATE: 84 BPM | OXYGEN SATURATION: 98 %

## 2022-09-26 DIAGNOSIS — Z11.1 SCREENING FOR TUBERCULOSIS: ICD-10-CM

## 2022-09-26 DIAGNOSIS — J06.9 VIRAL URI: Primary | ICD-10-CM

## 2022-09-26 LAB
SARS-COV-2 AG UPPER RESP QL IA: NEGATIVE
VALID CONTROL: NORMAL

## 2022-09-26 PROCEDURE — 87811 SARS-COV-2 COVID19 W/OPTIC: CPT | Performed by: NURSE PRACTITIONER

## 2022-09-26 PROCEDURE — 99213 OFFICE O/P EST LOW 20 MIN: CPT | Performed by: NURSE PRACTITIONER

## 2022-09-26 PROCEDURE — 87636 SARSCOV2 & INF A&B AMP PRB: CPT | Performed by: NURSE PRACTITIONER

## 2022-09-26 NOTE — PROGRESS NOTES
OFFICE VISIT  Julien Guy 40 y o  female MRN: [id number]          Assessment / Plan:  Problem List Items Addressed This Visit        Respiratory    Viral URI - Primary     You have a viral infection  Advil as needed for pain relief  continue with theraflu,  Increase fluids, Rest  Call office if symptoms persistent in 5 days for worsening  Will call with covid/flu          Relevant Orders    Covid/Flu- Office Collect    POCT Rapid Covid Ag (Completed)      Other Visit Diagnoses     Screening for tuberculosis        Relevant Orders    Quantiferon TB Gold Plus            Reason For Visit / Chief Complaint  Chief Complaint   Patient presents with    Cold Like Symptoms     Pt c/o the following :  Cough , fever , body aches, fatigue onset Friday  Tested Saturday - neg for covid         HPI:  Julien Guy is a 40 y o  female who presents today for uri like symptoms, started Friday,  worsened into sunday, complaints of cough, sneezing, congestion, low grade, similar symptoms of child       Historical Information   Past Medical History:   Diagnosis Date    [facility]     Depression     Hypertension     with pregnancy    Pre-eclampsia      Past Surgical History:   Procedure Laterality Date    KNEE ARTHROSCOPY       Social History   Social History     Substance and Sexual Activity   Alcohol Use Yes    Comment: socially     Social History     Substance and Sexual Activity   Drug Use Never     Social History     Tobacco Use   Smoking Status Never Smoker   Smokeless Tobacco Never Used   Tobacco Comment    smokes 1 cigarette "socially"      Family History   Problem Relation Age of Onset    Asthma Mother     Anemia Mother     Arthritis Mother        Meds/Allergies   Allergies   Allergen Reactions    Other Other (See Comments)     Shrimp    Pepcid [Famotidine]     Shellfish-Derived Products - Food Allergy        Meds:    Current Outpatient Medications:     cyclobenzaprine (FLEXERIL) 10 mg tablet, Take 1 tablet (10 mg total) by mouth 3 (three) times a day as needed for muscle spasms (Patient not taking: Reported on 9/26/2022), Disp: 90 tablet, Rfl: 1    dicyclomine (BENTYL) 20 mg tablet, Take 1 tablet (20 mg total) by mouth every 6 (six) hours (Patient not taking: Reported on 9/26/2022), Disp: 90 tablet, Rfl: 1    ergocalciferol (VITAMIN D2) 50,000 units, Take 1 capsule (50,000 Units total) by mouth once a week (Patient not taking: Reported on 9/26/2022), Disp: 12 capsule, Rfl: 1    ibuprofen (MOTRIN) 800 mg tablet, Take 1 tablet (800 mg total) by mouth every 8 (eight) hours as needed for mild pain (Patient not taking: Reported on 9/26/2022), Disp: 21 tablet, Rfl: 0      REVIEW OF SYSTEMS  Review of Systems   Constitutional: Positive for activity change, fatigue and fever  Negative for chills  HENT: Positive for congestion, rhinorrhea and sneezing  Negative for ear discharge, ear pain, sinus pressure, sinus pain, sore throat, tinnitus and trouble swallowing  Eyes: Negative for photophobia, pain, discharge, itching and visual disturbance  Respiratory: Positive for cough  Negative for chest tightness, shortness of breath and wheezing  Cardiovascular: Negative for chest pain and leg swelling  Gastrointestinal: Negative for abdominal distention, abdominal pain, constipation, diarrhea, nausea and vomiting  Endocrine: Negative for polydipsia, polyphagia and polyuria  Genitourinary: Negative for dysuria and frequency  Musculoskeletal: Negative for arthralgias, myalgias, neck pain and neck stiffness  Skin: Negative for color change  Neurological: Negative for dizziness, syncope, weakness, numbness and headaches  Hematological: Does not bruise/bleed easily  Psychiatric/Behavioral: Negative for behavioral problems, confusion, self-injury, sleep disturbance and suicidal ideas  The patient is not nervous/anxious              Current Vitals:   Pulse: 84 (09/26/22 1215)  Temperature: 98 4 °F (36 9 °C) (09/26/22 1215)  SpO2: 98 % (09/26/22 1215)  [unfilled]    PHYSICAL EXAMS:  Physical Exam  Constitutional:       Appearance: She is ill-appearing  HENT:      Head: Normocephalic and atraumatic  Pulmonary:      Effort: Pulmonary effort is normal       Comments: Moist cough during exam   Neurological:      General: No focal deficit present  Mental Status: She is alert and oriented to person, place, and time  Psychiatric:         Mood and Affect: Mood normal          Behavior: Behavior normal              Lab, imaging and other studies: I have personally reviewed pertinent reports  Melchor Malik

## 2022-09-26 NOTE — ASSESSMENT & PLAN NOTE
You have a viral infection  Advil as needed for pain relief  continue with theraflu,  Increase fluids, Rest  Call office if symptoms persistent in 5 days for worsening   Will call with covid/flu

## 2022-09-27 LAB
FLUAV RNA RESP QL NAA+PROBE: NEGATIVE
FLUBV RNA RESP QL NAA+PROBE: NEGATIVE
SARS-COV-2 RNA RESP QL NAA+PROBE: NEGATIVE

## 2022-10-04 ENCOUNTER — LAB (OUTPATIENT)
Dept: LAB | Facility: CLINIC | Age: 37
End: 2022-10-04
Payer: COMMERCIAL

## 2022-10-04 DIAGNOSIS — Z11.1 SCREENING FOR TUBERCULOSIS: ICD-10-CM

## 2022-10-04 PROCEDURE — 86480 TB TEST CELL IMMUN MEASURE: CPT

## 2022-10-04 PROCEDURE — 36415 COLL VENOUS BLD VENIPUNCTURE: CPT

## 2022-10-06 LAB
GAMMA INTERFERON BACKGROUND BLD IA-ACNC: 0.05 IU/ML
M TB IFN-G BLD-IMP: NEGATIVE
M TB IFN-G CD4+ BCKGRND COR BLD-ACNC: 0 IU/ML
M TB IFN-G CD4+ BCKGRND COR BLD-ACNC: 0.01 IU/ML
MITOGEN IGNF BCKGRD COR BLD-ACNC: 7.89 IU/ML

## 2022-10-25 DIAGNOSIS — M54.41 CHRONIC BILATERAL LOW BACK PAIN WITH RIGHT-SIDED SCIATICA: ICD-10-CM

## 2022-10-25 DIAGNOSIS — G89.29 CHRONIC BILATERAL LOW BACK PAIN WITH RIGHT-SIDED SCIATICA: ICD-10-CM

## 2022-10-25 RX ORDER — CYCLOBENZAPRINE HCL 10 MG
TABLET ORAL
Qty: 90 TABLET | Refills: 0 | Status: SHIPPED | OUTPATIENT
Start: 2022-10-25

## 2022-11-25 PROBLEM — J06.9 VIRAL URI: Status: RESOLVED | Noted: 2020-03-06 | Resolved: 2022-11-25

## 2023-01-19 ENCOUNTER — OFFICE VISIT (OUTPATIENT)
Dept: URGENT CARE | Facility: CLINIC | Age: 38
End: 2023-01-19

## 2023-01-19 VITALS
HEART RATE: 82 BPM | SYSTOLIC BLOOD PRESSURE: 113 MMHG | HEIGHT: 62 IN | DIASTOLIC BLOOD PRESSURE: 83 MMHG | RESPIRATION RATE: 20 BRPM | BODY MASS INDEX: 25.4 KG/M2 | OXYGEN SATURATION: 98 % | WEIGHT: 138 LBS | TEMPERATURE: 97.7 F

## 2023-01-19 DIAGNOSIS — J02.9 SORE THROAT: Primary | ICD-10-CM

## 2023-01-19 LAB — S PYO AG THROAT QL: NEGATIVE

## 2023-01-19 NOTE — LETTER
January 19, 2023     Patient: Emmer Cowden   YOB: 1985   Date of Visit: 1/19/2023       To Whom it May Concern:    Emmer Cowden was seen in my clinic on 1/19/2023  She may return to work on next scheduled shift 1/24/23  If you have any questions or concerns, please don't hesitate to call           Sincerely,          BARBIE Cortes        CC: No Recipients

## 2023-01-19 NOTE — PROGRESS NOTES
[facility]        NAME: Jamie Spaulding is a 40 y o  female  : 1985    MRN: [id number]  DATE: 2023  TIME: 9:10 AM    Assessment and Plan   Sore throat [J02 9]  1  Sore throat  POCT rapid strepA    Throat culture    COVID Only -Office Collect            Patient Instructions     Patient Instructions   Rapid strep negative  Will send throat culture  If positive I will call you and discuss antibiotics  Also check for Covid You need to isolate until results are back  Rest and drink extra fluids  Cool mist humidifier at night  Salt water gargles and chloraseptic spray  Tea with honey  Tylenol/Ibuprofen for pain/fever  Covid vitamins- vitamin D3 2000 IU oral daily, Vitamin C 1 gram oral q 12 hours and multivitamin daily  Follow up with PCP if no improvement  Go to the ER with any worsening symptoms, chest pain, shortness of breath, difficulty breathing, lethargy, confusion, dehydration or change in skin color  If Covid test is negative, you may discontinue isolation when fever free for 24 hours without the use of a fever reducing agent  If covid test is positive, you may discontinue isolation 5 days after symptom onset and when fever free for 24 hours without the use of a fever reducing agent  Upon discontinuing isolation you must continue to wear a mask for an additional 5 days  Sore Throat, Ambulatory Care   GENERAL INFORMATION:   A sore throat  is often caused by a cold or flu virus  A sore throat may also be caused by bacteria such as strep  Other causes include smoking, a runny nose, allergies, or acid reflux  Seek immediate care for the following symptoms:   · Trouble breathing or swallowing because your throat is swollen or sore    · Drooling because it hurts too much to swallow    · A painful lump in your throat that does not go away after 5 days    · A fever higher than 102? F (39?C) or lasts longer than 3 days    · Confusion    · Blood in your throat or ear  Treatment for a sore throat  will depend on the cause how severe it is  A sore throat cause by a virus will go away on its own without treatment  You will need antibiotics if your sore throat is caused by bacteria  Your sore throat should start to feel better within 3 to 5 days for both viral and bacterial infections  Care for your sore throat:   · Gargle with salt water  Mix ¼ teaspoon salt in a glass of warm water and gargle  This may help reduce swelling in your throat  · Take ibuprofen or acetaminophen:  These medicines decrease pain and fever  They are available without a doctor's order  Ask your healthcare provider which medicine is best for you  Ask how much to take and how often to take it  · Drink more liquids  Cold or warm drinks may help soothe your sore throat  Drinking liquids can also help prevent dehydration  · Use a cool-steam humidifier  to help moisten the air in your room and reduce your throat pain  · Use lozenges, ice, soft foods, or popsicles  to soothe your throat  · Rest your throat as much as possible  Try not to use your voice  This may irritate your throat and worsen your symptoms  Follow up with your healthcare provider as directed:  Write down your questions so you remember to ask them during your visits  CARE AGREEMENT:   You have the right to help plan your care  Learn about your health condition and how it may be treated  Discuss treatment options with your caregivers to decide what care you want to receive  You always have the right to refuse treatment  The above information is an  only  It is not intended as medical advice for individual conditions or treatments  Talk to your doctor, nurse or pharmacist before following any medical regimen to see if it is safe and effective for you  © 2014 [address] is for End User's use only and may not be sold, redistributed or otherwise used for commercial purposes   All illustrations and images included in CareNotes® are the copyrighted property of [employer]  or [employer]  Follow-up with PCP in the next 3-5 days if no improvement  Go to the ED if symptoms severely worsen  Chief Complaint     Chief Complaint   Patient presents with   • Sore Throat     Since 3am, no other symptoms         History of Present Illness     Nadia San is a 40 y o  female presenting to the office today for sore throat  Symptoms started 6 hours ago  No other symptoms at this time  No fevers, chills, cough, rhinorrhea, N/V/D  She works as a nurse  Missed work today and is also requesting a note  She has tried motrin for her symptoms, slight relief  Sick contacts include: patient coughed in her face recently- was not a Covid pt, but had MRSA  Review of Systems     Review of Systems   Constitutional: Negative for chills, fatigue and fever  HENT: Positive for sore throat  Negative for congestion, ear pain and rhinorrhea  Eyes: Negative for discharge, redness and visual disturbance  Respiratory: Negative for cough and shortness of breath  Cardiovascular: Negative for chest pain and palpitations  Gastrointestinal: Negative for abdominal pain, nausea and vomiting  Musculoskeletal: Negative for arthralgias and myalgias  Skin: Negative for color change and rash  Neurological: Negative for dizziness, weakness and headaches         Current Medications       Current Outpatient Medications:   •  cyclobenzaprine (FLEXERIL) 10 mg tablet, TAKE 1 TABLET BY MOUTH THREE TIMES A DAY AS NEEDED FOR MUSCLE SPASMS, Disp: 90 tablet, Rfl: 0  •  dicyclomine (BENTYL) 20 mg tablet, Take 1 tablet (20 mg total) by mouth every 6 (six) hours (Patient not taking: Reported on 9/26/2022), Disp: 90 tablet, Rfl: 1  •  ergocalciferol (VITAMIN D2) 50,000 units, Take 1 capsule (50,000 Units total) by mouth once a week (Patient not taking: Reported on 9/26/2022), Disp: 12 capsule, Rfl: 1  •  ibuprofen (MOTRIN) 800 mg tablet, Take 1 tablet (800 mg total) by mouth every 8 (eight) hours as needed for mild pain (Patient not taking: Reported on 9/26/2022), Disp: 21 tablet, Rfl: 0    Current Allergies     Allergies as of 01/19/2023 - Reviewed 01/19/2023   Allergen Reaction Noted   • Other Other (See Comments) 09/04/2021   • Pepcid [famotidine]  11/08/2019   • Shellfish-derived products - food allergy  11/08/2019            The following portions of the patient's history were reviewed and updated as appropriate: allergies, current medications, past family history, past medical history, past social history, past surgical history and problem list      Past Medical History:   Diagnosis Date   • A-fib ([facility] )    • Depression    • Hypertension     with pregnancy   • Pre-eclampsia        Past Surgical History:   Procedure Laterality Date   • KNEE ARTHROSCOPY         Family History   Problem Relation Age of Onset   • Asthma Mother    • Anemia Mother    • Arthritis Mother        Medications have been verified  Objective     /83   Pulse 82   Temp 97 7 °F (36 5 °C)   Resp 20   Ht 5' 2" (1 575 m)   Wt 62 6 kg (138 lb)   SpO2 98%   BMI 25 24 kg/m²   No LMP recorded  Physical Exam     Physical Exam  Constitutional:       General: She is not in acute distress  Appearance: Normal appearance  HENT:      Head: Normocephalic and atraumatic  Right Ear: Tympanic membrane and ear canal normal       Left Ear: Tympanic membrane and ear canal normal       Nose: No congestion or rhinorrhea  Mouth/Throat:      Mouth: Mucous membranes are dry  Pharynx: Uvula midline  Posterior oropharyngeal erythema present  No oropharyngeal exudate or uvula swelling  Tonsils: No tonsillar exudate  0 on the right  0 on the left  Eyes:      Conjunctiva/sclera: Conjunctivae normal    Cardiovascular:      Rate and Rhythm: Normal rate and regular rhythm  Heart sounds: Normal heart sounds     Pulmonary:      Effort: Pulmonary effort is normal       Breath sounds: Normal breath sounds  No wheezing, rhonchi or rales  Musculoskeletal:      Cervical back: Tenderness present  Lymphadenopathy:      Cervical: No cervical adenopathy  Skin:     General: Skin is warm and dry  Neurological:      Mental Status: She is alert  Psychiatric:         Behavior: Behavior is cooperative

## 2023-01-19 NOTE — PATIENT INSTRUCTIONS
Rapid strep negative  Will send throat culture  If positive I will call you and discuss antibiotics  Also check for Covid You need to isolate until results are back  Rest and drink extra fluids  Cool mist humidifier at night  Salt water gargles and chloraseptic spray  Tea with honey  Tylenol/Ibuprofen for pain/fever  Covid vitamins- vitamin D3 2000 IU oral daily, Vitamin C 1 gram oral q 12 hours and multivitamin daily  Follow up with PCP if no improvement  Go to the ER with any worsening symptoms, chest pain, shortness of breath, difficulty breathing, lethargy, confusion, dehydration or change in skin color  If Covid test is negative, you may discontinue isolation when fever free for 24 hours without the use of a fever reducing agent  If covid test is positive, you may discontinue isolation 5 days after symptom onset and when fever free for 24 hours without the use of a fever reducing agent  Upon discontinuing isolation you must continue to wear a mask for an additional 5 days  Sore Throat, Ambulatory Care   GENERAL INFORMATION:   A sore throat  is often caused by a cold or flu virus  A sore throat may also be caused by bacteria such as strep  Other causes include smoking, a runny nose, allergies, or acid reflux  Seek immediate care for the following symptoms:   Trouble breathing or swallowing because your throat is swollen or sore    Drooling because it hurts too much to swallow    A painful lump in your throat that does not go away after 5 days    A fever higher than 102? F (39?C) or lasts longer than 3 days    Confusion    Blood in your throat or ear  Treatment for a sore throat  will depend on the cause how severe it is  A sore throat cause by a virus will go away on its own without treatment  You will need antibiotics if your sore throat is caused by bacteria  Your sore throat should start to feel better within 3 to 5 days for both viral and bacterial infections    Care for your sore throat: Gargle with salt water  Mix ¼ teaspoon salt in a glass of warm water and gargle  This may help reduce swelling in your throat  Take ibuprofen or acetaminophen:  These medicines decrease pain and fever  They are available without a doctor's order  Ask your healthcare provider which medicine is best for you  Ask how much to take and how often to take it  Drink more liquids  Cold or warm drinks may help soothe your sore throat  Drinking liquids can also help prevent dehydration  Use a cool-steam humidifier  to help moisten the air in your room and reduce your throat pain  Use lozenges, ice, soft foods, or popsicles  to soothe your throat  Rest your throat as much as possible  Try not to use your voice  This may irritate your throat and worsen your symptoms  Follow up with your healthcare provider as directed:  Write down your questions so you remember to ask them during your visits  CARE AGREEMENT:   You have the right to help plan your care  Learn about your health condition and how it may be treated  Discuss treatment options with your caregivers to decide what care you want to receive  You always have the right to refuse treatment  The above information is an  only  It is not intended as medical advice for individual conditions or treatments  Talk to your doctor, nurse or pharmacist before following any medical regimen to see if it is safe and effective for you  © 2014 [address] is for End User's use only and may not be sold, redistributed or otherwise used for commercial purposes  All illustrations and images included in CareNotes® are the copyrighted property of [employer]  or Cristian Mackey

## 2023-01-20 LAB — SARS-COV-2 RNA RESP QL NAA+PROBE: NEGATIVE

## 2023-01-21 LAB — BACTERIA THROAT CULT: NORMAL

## 2023-03-24 DIAGNOSIS — Z00.00 ENCOUNTER FOR WELL ADULT EXAM WITHOUT ABNORMAL FINDINGS: Primary | ICD-10-CM

## 2023-03-25 ENCOUNTER — LAB (OUTPATIENT)
Dept: LAB | Facility: CLINIC | Age: 38
End: 2023-03-25

## 2023-03-25 DIAGNOSIS — Z00.00 ENCOUNTER FOR WELL ADULT EXAM WITHOUT ABNORMAL FINDINGS: ICD-10-CM

## 2023-03-25 LAB
BASOPHILS # BLD AUTO: 0.08 THOUSANDS/ÂΜL (ref 0–0.1)
BASOPHILS NFR BLD AUTO: 1 % (ref 0–1)
EOSINOPHIL # BLD AUTO: 0.47 THOUSAND/ÂΜL (ref 0–0.61)
EOSINOPHIL NFR BLD AUTO: 7 % (ref 0–6)
ERYTHROCYTE [DISTWIDTH] IN BLOOD BY AUTOMATED COUNT: 11.7 % (ref 11.6–15.1)
HCT VFR BLD AUTO: 37.2 % (ref 34.8–46.1)
HGB BLD-MCNC: 12.5 G/DL (ref 11.5–15.4)
IMM GRANULOCYTES # BLD AUTO: 0.01 THOUSAND/UL (ref 0–0.2)
IMM GRANULOCYTES NFR BLD AUTO: 0 % (ref 0–2)
LYMPHOCYTES # BLD AUTO: 2.25 THOUSANDS/ÂΜL (ref 0.6–4.47)
LYMPHOCYTES NFR BLD AUTO: 33 % (ref 14–44)
MCH RBC QN AUTO: 30 PG (ref 26.8–34.3)
MCHC RBC AUTO-ENTMCNC: 33.6 G/DL (ref 31.4–37.4)
MCV RBC AUTO: 89 FL (ref 82–98)
MONOCYTES # BLD AUTO: 0.42 THOUSAND/ÂΜL (ref 0.17–1.22)
MONOCYTES NFR BLD AUTO: 6 % (ref 4–12)
NEUTROPHILS # BLD AUTO: 3.5 THOUSANDS/ÂΜL (ref 1.85–7.62)
NEUTS SEG NFR BLD AUTO: 53 % (ref 43–75)
NRBC BLD AUTO-RTO: 0 /100 WBCS
PLATELET # BLD AUTO: 279 THOUSANDS/UL (ref 149–390)
PMV BLD AUTO: 11.5 FL (ref 8.9–12.7)
RBC # BLD AUTO: 4.16 MILLION/UL (ref 3.81–5.12)
WBC # BLD AUTO: 6.73 THOUSAND/UL (ref 4.31–10.16)

## 2023-04-04 ENCOUNTER — OFFICE VISIT (OUTPATIENT)
Dept: FAMILY MEDICINE CLINIC | Facility: CLINIC | Age: 38
End: 2023-04-04

## 2023-04-04 ENCOUNTER — LAB (OUTPATIENT)
Dept: LAB | Facility: CLINIC | Age: 38
End: 2023-04-04

## 2023-04-04 VITALS
BODY MASS INDEX: 25.69 KG/M2 | SYSTOLIC BLOOD PRESSURE: 110 MMHG | WEIGHT: 139.6 LBS | HEIGHT: 62 IN | RESPIRATION RATE: 18 BRPM | TEMPERATURE: 96.6 F | DIASTOLIC BLOOD PRESSURE: 70 MMHG | HEART RATE: 84 BPM | OXYGEN SATURATION: 99 %

## 2023-04-04 DIAGNOSIS — R10.9 NONSPECIFIC ABDOMINAL PAIN: ICD-10-CM

## 2023-04-04 DIAGNOSIS — D36.9 DERMOID CYST: ICD-10-CM

## 2023-04-04 DIAGNOSIS — E55.9 VITAMIN D DEFICIENCY: ICD-10-CM

## 2023-04-04 DIAGNOSIS — Z00.00 ENCOUNTER FOR WELL ADULT EXAM WITHOUT ABNORMAL FINDINGS: Primary | ICD-10-CM

## 2023-04-04 DIAGNOSIS — R53.82 CHRONIC FATIGUE: ICD-10-CM

## 2023-04-04 DIAGNOSIS — Z00.00 ENCOUNTER FOR WELL ADULT EXAM WITHOUT ABNORMAL FINDINGS: ICD-10-CM

## 2023-04-04 LAB
25(OH)D3 SERPL-MCNC: 17.1 NG/ML (ref 30–100)
ALBUMIN SERPL BCP-MCNC: 3.8 G/DL (ref 3.5–5)
ALP SERPL-CCNC: 55 U/L (ref 46–116)
ALT SERPL W P-5'-P-CCNC: 24 U/L (ref 12–78)
ANION GAP SERPL CALCULATED.3IONS-SCNC: 1 MMOL/L (ref 4–13)
AST SERPL W P-5'-P-CCNC: 16 U/L (ref 5–45)
BILIRUB SERPL-MCNC: 0.51 MG/DL (ref 0.2–1)
BUN SERPL-MCNC: 16 MG/DL (ref 5–25)
CALCIUM SERPL-MCNC: 9.2 MG/DL (ref 8.3–10.1)
CHLORIDE SERPL-SCNC: 109 MMOL/L (ref 96–108)
CHOLEST SERPL-MCNC: 148 MG/DL
CO2 SERPL-SCNC: 26 MMOL/L (ref 21–32)
CREAT SERPL-MCNC: 0.86 MG/DL (ref 0.6–1.3)
FERRITIN SERPL-MCNC: 155 NG/ML (ref 8–388)
GFR SERPL CREATININE-BSD FRML MDRD: 86 ML/MIN/1.73SQ M
GLUCOSE P FAST SERPL-MCNC: 87 MG/DL (ref 65–99)
HDLC SERPL-MCNC: 59 MG/DL
LDLC SERPL CALC-MCNC: 78 MG/DL (ref 0–100)
POTASSIUM SERPL-SCNC: 4.1 MMOL/L (ref 3.5–5.3)
PROT SERPL-MCNC: 7.3 G/DL (ref 6.4–8.4)
SODIUM SERPL-SCNC: 136 MMOL/L (ref 135–147)
TRIGL SERPL-MCNC: 54 MG/DL
TSH SERPL DL<=0.05 MIU/L-ACNC: 0.87 UIU/ML (ref 0.45–4.5)

## 2023-04-04 RX ORDER — ERGOCALCIFEROL 1.25 MG/1
50000 CAPSULE ORAL WEEKLY
Qty: 12 CAPSULE | Refills: 1 | Status: SHIPPED | OUTPATIENT
Start: 2023-04-04

## 2023-04-04 RX ORDER — IBUPROFEN 800 MG/1
800 TABLET ORAL EVERY 8 HOURS PRN
Qty: 21 TABLET | Refills: 0 | Status: SHIPPED | OUTPATIENT
Start: 2023-04-04

## 2023-04-04 NOTE — PROGRESS NOTES
BMI Counseling: Body mass index is 25 53 kg/m²  The BMI is above normal  Nutrition recommendations include reducing portion sizes  Exercise recommendations include moderate aerobic physical activity for 150 minutes/week  ADULT ANNUAL [address]    NAME: Nazia Orozco  AGE: 40 y o  SEX: female  : 1985     DATE: 2023     Assessment and Plan:     Problem List Items Addressed This Visit    None  Visit Diagnoses     Encounter for well adult exam without abnormal findings    -  Primary    Relevant Orders    Comprehensive metabolic panel    Lipid Panel with Direct LDL reflex    Vitamin D deficiency        Relevant Medications    ergocalciferol (VITAMIN D2) 50,000 units    Other Relevant Orders    Vitamin D 25 hydroxy    Nonspecific abdominal pain        Relevant Medications    ibuprofen (MOTRIN) 800 mg tablet    Dermoid cyst        Relevant Orders    Ambulatory Referral to Dermatology    Chronic fatigue        Relevant Orders    TSH, 3rd generation with Free T4 reflex    Iron Panel (Includes Ferritin, Iron Sat%, Iron, and TIBC)          Immunizations and preventive care screenings were discussed with patient today  Appropriate education was printed on patient's after visit summary  Counseling:  Alcohol/drug use: discussed moderation in alcohol intake, the recommendations for healthy alcohol use, and avoidance of illicit drug use  Dental Health: discussed importance of regular tooth brushing, flossing, and dental visits  Injury prevention: discussed safety/seat belts, safety helmets, smoke detectors, carbon dioxide detectors, and smoking near bedding or upholstery  Sexual health: discussed sexually transmitted diseases, partner selection, use of condoms, avoidance of unintended pregnancy, and contraceptive alternatives  · Exercise: the importance of regular exercise/physical activity was discussed   Recommend exercise 3-5 times per week for at least 30 minutes  No follow-ups on file  Chief Complaint:     Chief Complaint   Patient presents with   • Physical Exam     Feeling really tired, mole on left leg      History of Present Illness:     Adult Annual Physical   Patient here for a comprehensive physical exam  The patient reports problems - fatigue  Diet and Physical Activity  · Diet/Nutrition: well balanced diet  · Exercise: no formal exercise  Depression Screening  PHQ-2/9 Depression Screening    Little interest or pleasure in doing things: 0 - not at all  Feeling down, depressed, or hopeless: 0 - not at all  Trouble falling or staying asleep, or sleeping too much: 3 - nearly every day  Feeling tired or having little energy: 3 - nearly every day  Poor appetite or overeatin - not at all  Feeling bad about yourself - or that you are a failure or have let yourself or your family down: 0 - not at all  Trouble concentrating on things, such as reading the newspaper or watching television: 1 - several days  Moving or speaking so slowly that other people could have noticed  Or the opposite - being so fidgety or restless that you have been moving around a lot more than usual: 0 - not at all  Thoughts that you would be better off dead, or of hurting yourself in some way: 0 - not at all  PHQ-9 Score: 7   PHQ-9 Interpretation: Mild depression        General Health  · Sleep: gets 4-6 hours of sleep on average  · Hearing: normal - bilateral   · Vision: wears glasses  · Dental: regular dental visits  /GYN Health  · Last menstrual period: heavy bleeding at times  · Contraceptive method: IUD placement  · History of STDs?: no      Review of Systems:     Review of Systems   Constitutional: Positive for fatigue  Negative for activity change, chills and fever  HENT: Negative for congestion, ear discharge, ear pain, sinus pressure, sinus pain, sore throat, tinnitus and trouble swallowing      Eyes: Negative for "photophobia, pain, discharge, itching and visual disturbance  Respiratory: Negative for cough, chest tightness, shortness of breath and wheezing  Cardiovascular: Negative for chest pain and leg swelling  Gastrointestinal: Negative for abdominal distention, abdominal pain, constipation, diarrhea, nausea and vomiting  Endocrine: Negative for polydipsia, polyphagia and polyuria  Genitourinary: Negative for dysuria and frequency  Musculoskeletal: Negative for arthralgias, myalgias, neck pain and neck stiffness  Skin: Negative for color change  Neurological: Negative for dizziness, syncope, weakness, numbness and headaches  Hematological: Does not bruise/bleed easily  Psychiatric/Behavioral: Negative for behavioral problems, confusion, self-injury, sleep disturbance and suicidal ideas  The patient is not nervous/anxious         Past Medical History:     Past Medical History:   Diagnosis Date   • A-fib ([facility] )    • Depression    • Hypertension     with pregnancy   • Pre-eclampsia       Past Surgical History:     Past Surgical History:   Procedure Laterality Date   • KNEE ARTHROSCOPY        Social History:     Social History     Socioeconomic History   • Marital status: Single     Spouse name: None   • Number of children: None   • Years of education: None   • Highest education level: None   Occupational History   • None   Tobacco Use   • Smoking status: Never   • Smokeless tobacco: Never   • Tobacco comments:     smokes 1 cigarette \"socially\"    Vaping Use   • Vaping Use: Never used   Substance and Sexual Activity   • Alcohol use: Yes     Comment: socially   • Drug use: Never   • Sexual activity: None   Other Topics Concern   • None   Social History Narrative   • None     Social Determinants of Health     Financial Resource Strain: Not on file   Food Insecurity: Not on file   Transportation Needs: Not on file   Physical Activity: Not on file   Stress: Not on file   Social Connections: Not on file " "  Intimate Partner Violence: Not on file   Housing Stability: Not on file      Family History:     Family History   Problem Relation Age of Onset   • Asthma Mother    • Anemia Mother    • Arthritis Mother       Current Medications:     Current Outpatient Medications   Medication Sig Dispense Refill   • cyclobenzaprine (FLEXERIL) 10 mg tablet TAKE 1 TABLET BY MOUTH THREE TIMES A DAY AS NEEDED FOR MUSCLE SPASMS 90 tablet 0   • dicyclomine (BENTYL) 20 mg tablet Take 1 tablet (20 mg total) by mouth every 6 (six) hours (Patient taking differently: Take 20 mg by mouth every 6 (six) hours As needed) 90 tablet 1   • ergocalciferol (VITAMIN D2) 50,000 units Take 1 capsule (50,000 Units total) by mouth once a week 12 capsule 1   • ibuprofen (MOTRIN) 800 mg tablet Take 1 tablet (800 mg total) by mouth every 8 (eight) hours as needed for mild pain 21 tablet 0     No current facility-administered medications for this visit  Allergies: Allergies   Allergen Reactions   • Other Other (See Comments)     Shrimp   • Pepcid [Famotidine]    • Shellfish-Derived Products - Food Allergy       Physical Exam:     /70 (BP Location: Left arm, Patient Position: Sitting, Cuff Size: Standard)   Pulse 84   Temp (!) 96 6 °F (35 9 °C) (Tympanic)   Resp 18   Ht 5' 2\" (1 575 m)   Wt 63 3 kg (139 lb 9 6 oz)   SpO2 99%   BMI 25 53 kg/m²     Physical Exam  Vitals and nursing note reviewed  Constitutional:       Appearance: Normal appearance  She is well-developed  HENT:      Head: Normocephalic and atraumatic  Right Ear: Tympanic membrane normal       Left Ear: Tympanic membrane normal       Nose: Nose normal       Mouth/Throat:      Mouth: Mucous membranes are moist    Eyes:      Extraocular Movements: Extraocular movements intact  Pupils: Pupils are equal, round, and reactive to light  Cardiovascular:      Rate and Rhythm: Normal rate and regular rhythm  Pulses: Normal pulses        Heart sounds: Normal heart " sounds  Pulmonary:      Effort: Pulmonary effort is normal       Breath sounds: Normal breath sounds  No wheezing or rhonchi  Abdominal:      General: There is no distension  Tenderness: There is no abdominal tenderness  Musculoskeletal:         General: No swelling, tenderness, deformity or signs of injury  Normal range of motion  Cervical back: Normal range of motion  Right lower leg: No edema  Left lower leg: No edema  Skin:     General: Skin is warm  Findings: Lesion present  No bruising, erythema or rash  Comments: Left medial aspect knee    Neurological:      General: No focal deficit present  Mental Status: She is alert and oriented to person, place, and time  Psychiatric:         Mood and Affect: Mood normal          Behavior: Behavior normal          Thought Content:  Thought content normal          Judgment: Judgment normal           Estela Crawford, [address]

## 2023-04-05 NOTE — RESULT ENCOUNTER NOTE
Please let her know I reviewed her labs, her vit d is low at 16  I sent in her vit d yesterday at her appt   Iron levels are still pending, otherwise everything else was normal  The vit d is most likely contributing to her fatigue

## 2023-04-06 LAB
IRON SATN MFR SERPL: 21 % (ref 15–50)
IRON SERPL-MCNC: 67 UG/DL (ref 50–170)
TIBC SERPL-MCNC: 320 UG/DL (ref 250–450)

## 2023-11-16 ENCOUNTER — OFFICE VISIT (OUTPATIENT)
Dept: URGENT CARE | Facility: CLINIC | Age: 38
End: 2023-11-16
Payer: COMMERCIAL

## 2023-11-16 VITALS
HEART RATE: 69 BPM | RESPIRATION RATE: 18 BRPM | SYSTOLIC BLOOD PRESSURE: 128 MMHG | OXYGEN SATURATION: 98 % | TEMPERATURE: 97.8 F | DIASTOLIC BLOOD PRESSURE: 88 MMHG

## 2023-11-16 DIAGNOSIS — R53.83 OTHER FATIGUE: ICD-10-CM

## 2023-11-16 DIAGNOSIS — J06.9 ACUTE URI: Primary | ICD-10-CM

## 2023-11-16 LAB
SARS-COV-2 AG UPPER RESP QL IA: NEGATIVE
VALID CONTROL: NORMAL

## 2023-11-16 PROCEDURE — 87636 SARSCOV2 & INF A&B AMP PRB: CPT | Performed by: PHYSICAL MEDICINE & REHABILITATION

## 2023-11-16 PROCEDURE — 99213 OFFICE O/P EST LOW 20 MIN: CPT | Performed by: PHYSICAL MEDICINE & REHABILITATION

## 2023-11-16 PROCEDURE — 87811 SARS-COV-2 COVID19 W/OPTIC: CPT | Performed by: PHYSICAL MEDICINE & REHABILITATION

## 2023-11-16 RX ORDER — ALPRAZOLAM 0.25 MG/1
TABLET ORAL
COMMUNITY
Start: 2023-10-24

## 2023-11-16 NOTE — PATIENT INSTRUCTIONS
Please follow up with PCP within 3-5 days  May take over the counter decongestants as needed. Can take over the counter cold medicine as needed. If symptoms do not resolve within a week please return.

## 2023-11-16 NOTE — LETTER
November 16, 2023     Patient: Tena Cherry   YOB: 1985   Date of Visit: 11/16/2023       To Whom It May Concern:    Tena Cherry was seen in my clinic on 11/16/2023 at 1:30 pm. Please excuse Desi Dodd for her absence from work on this day to make the appointment. If you have any questions or concerns, please don't hesitate to call.          Sincerely,         Abdoulaye Rojas PA-C        CC: No Recipients

## 2023-11-16 NOTE — PROGRESS NOTES
[facility]        NAME: Debi Clark is a 45 y.o. female  : 1985    MRN: [id number]  DATE: 2023  TIME: 2:56 PM    Assessment and Plan   Acute URI [J06.9]  1. Acute URI        2. Other fatigue  Poct Covid 19 Rapid Antigen Test    Covid/Flu-Office Collect        Rapid COVID and Flu negative  Advised to take over the counter decongestants and cold medicine as needed. Return if symptoms do no resolve within a week. Patient Instructions       Follow up with PCP in 3-5 days. Proceed to  ER if symptoms worsen. Chief Complaint     Chief Complaint   Patient presents with    Nasal Congestion     Congestion, fatigue, nausea. Patient reports that she woke up  and had in rib cage that lasted 2 days, resolved now. Body aches. History of Present Illness       Patient presenting with congestion, fatigue, nausea and body aches. She also reported on  having rib cage pain that last 2 days and is now resolved. Has been using nasinex and migraine medication. Review of Systems   Review of Systems   Constitutional:  Positive for fatigue. Negative for chills, diaphoresis and fever. HENT:  Positive for congestion, postnasal drip and rhinorrhea. Negative for ear pain, sinus pressure, sinus pain, sneezing and sore throat. Eyes: Negative. Respiratory:  Negative for cough and shortness of breath. Cardiovascular:  Negative for chest pain. Gastrointestinal:  Positive for nausea. Negative for diarrhea and vomiting. Endocrine: Negative. Musculoskeletal:  Positive for myalgias. Rib pain on  that resolved 2 days later   Skin:  Negative for pallor and rash. Allergic/Immunologic: Negative. Neurological:  Positive for dizziness and headaches.         Hx of tension headaches  Dizziness dependent on activity         Current Medications       Current Outpatient Medications:     ALPRAZolam (XANAX) 0.25 mg tablet, TAKE ONE (1) TABLET BY MOUTH DAILY AS NEEDED FOR SEVERE ANXIETY, Disp: , Rfl:     cyclobenzaprine (FLEXERIL) 10 mg tablet, TAKE 1 TABLET BY MOUTH THREE TIMES A DAY AS NEEDED FOR MUSCLE SPASMS, Disp: 90 tablet, Rfl: 0    dicyclomine (BENTYL) 20 mg tablet, Take 1 tablet (20 mg total) by mouth every 6 (six) hours (Patient taking differently: Take 20 mg by mouth every 6 (six) hours As needed), Disp: 90 tablet, Rfl: 1    ergocalciferol (VITAMIN D2) 50,000 units, Take 1 capsule (50,000 Units total) by mouth once a week, Disp: 12 capsule, Rfl: 1    ibuprofen (MOTRIN) 800 mg tablet, Take 1 tablet (800 mg total) by mouth every 8 (eight) hours as needed for mild pain, Disp: 21 tablet, Rfl: 0    Current Allergies     Allergies as of 11/16/2023 - Reviewed 11/16/2023   Allergen Reaction Noted    Other Other (See Comments) 09/04/2021    Pepcid [famotidine]  11/08/2019    Shellfish-derived products - food allergy  11/08/2019            The following portions of the patient's history were reviewed and updated as appropriate: allergies, current medications, past family history, past medical history, past social history, past surgical history and problem list.     Past Medical History:   Diagnosis Date    A-fib ([address])     Depression     Hypertension     with pregnancy    Pre-eclampsia        Past Surgical History:   Procedure Laterality Date    KNEE ARTHROSCOPY         Family History   Problem Relation Age of Onset    Asthma Mother     Anemia Mother     Arthritis Mother          Medications have been verified. Objective   /88   Pulse 69   Temp 97.8 °F (36.6 °C)   Resp 18   LMP 11/05/2023 (Approximate)   SpO2 98%        Physical Exam     Physical Exam  Vitals reviewed. Constitutional:       General: She is not in acute distress. Appearance: She is well-developed. She is not diaphoretic.    HENT:      Right Ear: External ear normal.      Left Ear: External ear normal.      Mouth/Throat:      Mouth: Mucous membranes are moist.      Pharynx: Oropharynx is clear. No posterior oropharyngeal erythema. Eyes:      General:         Right eye: No discharge. Left eye: No discharge. Conjunctiva/sclera: Conjunctivae normal.   Cardiovascular:      Rate and Rhythm: Normal rate and regular rhythm. Heart sounds: Normal heart sounds. Pulmonary:      Effort: Pulmonary effort is normal. No respiratory distress. Breath sounds: Normal breath sounds. No wheezing, rhonchi or rales. Musculoskeletal:      Cervical back: Normal range of motion and neck supple. Lymphadenopathy:      Cervical: No cervical adenopathy. Skin:     General: Skin is warm.    Psychiatric:         Mood and Affect: Mood normal.         Behavior: Behavior normal.

## 2023-12-19 DIAGNOSIS — K58.0 IRRITABLE BOWEL SYNDROME WITH DIARRHEA: ICD-10-CM

## 2023-12-19 RX ORDER — DICYCLOMINE HCL 20 MG
20 TABLET ORAL EVERY 6 HOURS
Qty: 90 TABLET | Refills: 1 | Status: SHIPPED | OUTPATIENT
Start: 2023-12-19

## 2023-12-27 ENCOUNTER — PATIENT MESSAGE (OUTPATIENT)
Dept: FAMILY MEDICINE CLINIC | Facility: CLINIC | Age: 38
End: 2023-12-27

## 2023-12-27 ENCOUNTER — OFFICE VISIT (OUTPATIENT)
Dept: FAMILY MEDICINE CLINIC | Facility: CLINIC | Age: 38
End: 2023-12-27
Payer: COMMERCIAL

## 2023-12-27 VITALS
WEIGHT: 138.5 LBS | TEMPERATURE: 97.7 F | OXYGEN SATURATION: 97 % | SYSTOLIC BLOOD PRESSURE: 114 MMHG | HEIGHT: 62 IN | HEART RATE: 68 BPM | DIASTOLIC BLOOD PRESSURE: 78 MMHG | BODY MASS INDEX: 25.49 KG/M2

## 2023-12-27 DIAGNOSIS — B34.9 VIRAL ILLNESS: Primary | ICD-10-CM

## 2023-12-27 PROCEDURE — 99213 OFFICE O/P EST LOW 20 MIN: CPT | Performed by: NURSE PRACTITIONER

## 2023-12-27 RX ORDER — OSELTAMIVIR PHOSPHATE 75 MG/1
75 CAPSULE ORAL EVERY 12 HOURS SCHEDULED
Qty: 10 CAPSULE | Refills: 0 | Status: SHIPPED | OUTPATIENT
Start: 2023-12-27 | End: 2024-01-01

## 2023-12-27 NOTE — ASSESSMENT & PLAN NOTE
Child currently at home with influenza A, recent start of symptoms of bodyaches fever chills cough yesterday.You have a viral infection. Advil as needed for pain relief. Increase fluids, Rest. Call office if symptoms persistent in 5 days for worsening.

## 2023-12-27 NOTE — LETTER
December 27, 2023     Patient: Myrtle Handley  YOB: 1985  Date of Visit: 12/27/2023      To Whom it May Concern:    Myrtle Handley is under my professional care. Myrtle was seen in my office on 12/27/2023.     If you have any questions or concerns, please don't hesitate to call.         Sincerely,          BARBIE Coker        CC: No Recipients

## 2023-12-27 NOTE — PROGRESS NOTES
"OFFICE VISIT  Myrtle Handley 38 y.o. female MRN: [id number]          Assessment / Plan:  Problem List Items Addressed This Visit          Other    Viral illness - Primary     Child currently at home with influenza A, recent start of symptoms of bodyaches fever chills cough yesterday.You have a viral infection. Advil as needed for pain relief. Increase fluids, Rest. Call office if symptoms persistent in 5 days for worsening.           Relevant Medications    oseltamivir (TAMIFLU) 75 mg capsule         Reason For Visit / Chief Complaint  Chief Complaint   Patient presents with    Cough     Pt states she is having body aches, headaches, cough, asking to be swabbed for flu and get tamiflu        HPI:  Myrtle Handley is a 38 y.o. female she reports cough, PND, she reports progression with muscle aches, body aches. Chills.    Historical Information   Past Medical History:   Diagnosis Date    A-fib ([facility])     Allergic     Arthritis     Depression     Hypertension     with pregnancy    Pre-eclampsia      Past Surgical History:   Procedure Laterality Date    KNEE ARTHROSCOPY       Social History   Social History     Substance and Sexual Activity   Alcohol Use Not Currently    Comment: socially     Social History     Substance and Sexual Activity   Drug Use Never     Social History     Tobacco Use   Smoking Status Never   Smokeless Tobacco Never   Tobacco Comments    smokes 1 cigarette \"socially\"      Family History   Problem Relation Age of Onset    Asthma Mother     Anemia Mother     Arthritis Mother        Meds/Allergies   Allergies   Allergen Reactions    Other Other (See Comments)     Shrimp    Pepcid [Famotidine]     Shellfish-Derived Products - Food Allergy        Meds:    Current Outpatient Medications:     ALPRAZolam (XANAX) 0.25 mg tablet, TAKE ONE (1) TABLET BY MOUTH DAILY AS NEEDED FOR SEVERE ANXIETY, Disp: , Rfl:     cyclobenzaprine (FLEXERIL) 10 mg tablet, TAKE 1 TABLET BY MOUTH THREE TIMES A DAY AS NEEDED FOR " "MUSCLE SPASMS, Disp: 90 tablet, Rfl: 0    dicyclomine (BENTYL) 20 mg tablet, TAKE 1 TABLET BY MOUTH EVERY 6 HOURS., Disp: 90 tablet, Rfl: 1    ergocalciferol (VITAMIN D2) 50,000 units, Take 1 capsule (50,000 Units total) by mouth once a week, Disp: 12 capsule, Rfl: 1    ibuprofen (MOTRIN) 800 mg tablet, Take 1 tablet (800 mg total) by mouth every 8 (eight) hours as needed for mild pain, Disp: 21 tablet, Rfl: 0    oseltamivir (TAMIFLU) 75 mg capsule, Take 1 capsule (75 mg total) by mouth every 12 (twelve) hours for 5 days, Disp: 10 capsule, Rfl: 0      REVIEW OF SYSTEMS  Review of Systems   Constitutional:  Positive for activity change, appetite change, chills, fatigue and fever.   HENT:  Positive for congestion, rhinorrhea, sneezing and sore throat. Negative for ear discharge, ear pain, sinus pressure, sinus pain, tinnitus and trouble swallowing.    Eyes:  Negative for photophobia, pain, discharge, itching and visual disturbance.   Respiratory:  Negative for cough, chest tightness, shortness of breath and wheezing.    Cardiovascular:  Negative for chest pain and leg swelling.   Gastrointestinal:  Negative for abdominal distention, abdominal pain, constipation, diarrhea, nausea and vomiting.   Endocrine: Negative for polydipsia, polyphagia and polyuria.   Genitourinary:  Negative for dysuria and frequency.   Musculoskeletal:  Positive for myalgias. Negative for arthralgias, neck pain and neck stiffness.   Skin:  Negative for color change.   Neurological:  Negative for dizziness, syncope, weakness, numbness and headaches.   Hematological:  Does not bruise/bleed easily.   Psychiatric/Behavioral:  Negative for behavioral problems, confusion, self-injury, sleep disturbance and suicidal ideas. The patient is not nervous/anxious.            Current Vitals:   Blood Pressure: 114/78 (12/27/23 1426)  Pulse: 68 (12/27/23 1426)  Temperature: 97.7 °F (36.5 °C) (12/27/23 1426)  Temp Source: Tympanic (12/27/23 1426)  Height: 5' 2\" " (157.5 cm) (12/27/23 1426)  Weight - Scale: 62.8 kg (138 lb 8 oz) (12/27/23 1426)  SpO2: 97 % (12/27/23 1426)  [unfilled]    PHYSICAL EXAMS:  Physical Exam  Vitals and nursing note reviewed.   Constitutional:       Appearance: Normal appearance. She is well-developed.   HENT:      Head: Normocephalic and atraumatic.      Right Ear: Tympanic membrane, ear canal and external ear normal.      Left Ear: Tympanic membrane, ear canal and external ear normal.      Nose: Nose normal.      Mouth/Throat:      Mouth: Mucous membranes are moist.   Eyes:      Extraocular Movements: Extraocular movements intact.      Conjunctiva/sclera: Conjunctivae normal.      Pupils: Pupils are equal, round, and reactive to light.   Cardiovascular:      Rate and Rhythm: Normal rate and regular rhythm.      Pulses: Normal pulses.      Heart sounds: Normal heart sounds.   Pulmonary:      Breath sounds: No wheezing or rhonchi.   Abdominal:      General: There is no distension.      Tenderness: There is no abdominal tenderness.   Musculoskeletal:         General: No swelling, tenderness, deformity or signs of injury.      Right lower leg: No edema.      Left lower leg: No edema.   Skin:     Findings: No bruising, erythema, lesion or rash.   Neurological:      General: No focal deficit present.      Mental Status: She is alert and oriented to person, place, and time.   Psychiatric:         Mood and Affect: Mood normal.         Behavior: Behavior normal.         Thought Content: Thought content normal.         Judgment: Judgment normal.             Lab, imaging and other studies: I have personally reviewed pertinent reports.  .

## 2024-04-02 ENCOUNTER — OFFICE VISIT (OUTPATIENT)
Dept: FAMILY MEDICINE CLINIC | Facility: CLINIC | Age: 39
End: 2024-04-02
Payer: COMMERCIAL

## 2024-04-02 VITALS
RESPIRATION RATE: 18 BRPM | HEART RATE: 62 BPM | WEIGHT: 138.8 LBS | DIASTOLIC BLOOD PRESSURE: 88 MMHG | OXYGEN SATURATION: 99 % | HEIGHT: 62 IN | TEMPERATURE: 98.7 F | BODY MASS INDEX: 25.54 KG/M2 | SYSTOLIC BLOOD PRESSURE: 134 MMHG

## 2024-04-02 DIAGNOSIS — G89.29 CHRONIC BILATERAL LOW BACK PAIN WITH RIGHT-SIDED SCIATICA: ICD-10-CM

## 2024-04-02 DIAGNOSIS — J01.10 ACUTE NON-RECURRENT FRONTAL SINUSITIS: Primary | ICD-10-CM

## 2024-04-02 DIAGNOSIS — K58.0 IRRITABLE BOWEL SYNDROME WITH DIARRHEA: ICD-10-CM

## 2024-04-02 DIAGNOSIS — M54.41 CHRONIC BILATERAL LOW BACK PAIN WITH RIGHT-SIDED SCIATICA: ICD-10-CM

## 2024-04-02 DIAGNOSIS — E55.9 VITAMIN D DEFICIENCY: ICD-10-CM

## 2024-04-02 LAB
SARS-COV-2 AG UPPER RESP QL IA: NEGATIVE
VALID CONTROL: NORMAL

## 2024-04-02 PROCEDURE — 99214 OFFICE O/P EST MOD 30 MIN: CPT | Performed by: NURSE PRACTITIONER

## 2024-04-02 PROCEDURE — 87804 INFLUENZA ASSAY W/OPTIC: CPT | Performed by: NURSE PRACTITIONER

## 2024-04-02 PROCEDURE — 87811 SARS-COV-2 COVID19 W/OPTIC: CPT | Performed by: NURSE PRACTITIONER

## 2024-04-02 RX ORDER — DICYCLOMINE HCL 20 MG
20 TABLET ORAL EVERY 6 HOURS
Qty: 90 TABLET | Refills: 1 | Status: SHIPPED | OUTPATIENT
Start: 2024-04-02

## 2024-04-02 RX ORDER — CYCLOBENZAPRINE HCL 10 MG
10 TABLET ORAL 3 TIMES DAILY PRN
Qty: 90 TABLET | Refills: 0 | Status: SHIPPED | OUTPATIENT
Start: 2024-04-02

## 2024-04-02 RX ORDER — AZITHROMYCIN 250 MG/1
TABLET, FILM COATED ORAL
Qty: 6 TABLET | Refills: 0 | Status: SHIPPED | OUTPATIENT
Start: 2024-04-02 | End: 2024-04-06

## 2024-04-02 RX ORDER — ERGOCALCIFEROL 1.25 MG/1
50000 CAPSULE ORAL WEEKLY
Qty: 12 CAPSULE | Refills: 1 | Status: SHIPPED | OUTPATIENT
Start: 2024-04-02

## 2024-04-02 RX ORDER — PREDNISONE 10 MG/1
TABLET ORAL
Qty: 18 TABLET | Refills: 0 | Status: SHIPPED | OUTPATIENT
Start: 2024-04-02

## 2024-04-02 NOTE — LETTER
April 2, 2024     Patient: Myrtle Handley  YOB: 1985  Date of Visit: 4/2/2024      To Whom it May Concern:    Myrtle Handley is under my professional care. Myrtle was seen in my office on 4/2/2024.     If you have any questions or concerns, please don't hesitate to call.         Sincerely,          BARBIE Coker        CC: No Recipients

## 2024-04-02 NOTE — PROGRESS NOTES
OFFICE VISIT  Myrtle Handley 38 y.o. female MRN: [id number]      Depression Screening and Follow-up Plan: Patient was screened for depression during today's encounter. They screened negative with a PHQ-9 score of 0.         Assessment / Plan:  Problem List Items Addressed This Visit          Digestive    Irritable bowel syndrome with diarrhea    Relevant Medications    dicyclomine (BENTYL) 20 mg tablet     Other Visit Diagnoses       Acute non-recurrent frontal sinusitis    -  Primary    Relevant Medications    predniSONE 10 mg tablet    azithromycin (ZITHROMAX) 250 mg tablet    Other Relevant Orders    POCT Rapid Covid Ag    POCT rapid flu A and B    Chronic bilateral low back pain with right-sided sciatica        Relevant Medications    cyclobenzaprine (FLEXERIL) 10 mg tablet    Vitamin D deficiency        Relevant Medications    ergocalciferol (VITAMIN D2) 50,000 units              Reason For Visit / Chief Complaint  Chief Complaint   Patient presents with    Migraine     Everyday for the past week. Neck pain. Low grade fevers last week a few days.         HPI:  Myrtle Handley is a 38 y.o. female who presents today for  Right side facial pain and pressure, low grade temp, malaise. She reports using motrin and robitussin. She reports symptoms reoccurring over the last week. Symptoms persisting. Using home remedies. Has been checking her blood pressure     Historical Information   Past Medical History:   Diagnosis Date    A-fib (HCC)     Allergic     Arthritis     Depression     Hypertension     with pregnancy    Pre-eclampsia      Past Surgical History:   Procedure Laterality Date    KNEE ARTHROSCOPY       Social History   Social History     Substance and Sexual Activity   Alcohol Use Not Currently    Comment: socially     Social History     Substance and Sexual Activity   Drug Use Never     Social History     Tobacco Use   Smoking Status Never   Smokeless Tobacco Never   Tobacco Comments    smokes 1 cigarette  "\"socially\"      Family History   Problem Relation Age of Onset    Asthma Mother     Anemia Mother     Arthritis Mother        Meds/Allergies   Allergies   Allergen Reactions    Other Other (See Comments)     Shrimp    Pepcid [Famotidine]     Shellfish-Derived Products - Food Allergy        Meds:    Current Outpatient Medications:     ALPRAZolam (XANAX) 0.25 mg tablet, TAKE ONE (1) TABLET BY MOUTH DAILY AS NEEDED FOR SEVERE ANXIETY, Disp: , Rfl:     azithromycin (ZITHROMAX) 250 mg tablet, Take 2 tablets today then 1 tablet daily x 4 days, Disp: 6 tablet, Rfl: 0    cyclobenzaprine (FLEXERIL) 10 mg tablet, Take 1 tablet (10 mg total) by mouth 3 (three) times a day as needed for muscle spasms, Disp: 90 tablet, Rfl: 0    dicyclomine (BENTYL) 20 mg tablet, Take 1 tablet (20 mg total) by mouth every 6 (six) hours, Disp: 90 tablet, Rfl: 1    ergocalciferol (VITAMIN D2) 50,000 units, Take 1 capsule (50,000 Units total) by mouth once a week, Disp: 12 capsule, Rfl: 1    ibuprofen (MOTRIN) 800 mg tablet, Take 1 tablet (800 mg total) by mouth every 8 (eight) hours as needed for mild pain, Disp: 21 tablet, Rfl: 0    predniSONE 10 mg tablet, 30 mg by mouth daily for 3 days, then 20 mg by mouth daily for 3 days, then 10 mg by mouth daily for 3 days, then stop, Disp: 18 tablet, Rfl: 0      REVIEW OF SYSTEMS  Review of Systems   Constitutional:  Negative for activity change, chills, fatigue and fever.   HENT:  Positive for sinus pressure and sinus pain. Negative for congestion, ear discharge, ear pain, sore throat, tinnitus and trouble swallowing.    Eyes:  Negative for photophobia, pain, discharge, itching and visual disturbance.   Respiratory:  Negative for cough, chest tightness, shortness of breath and wheezing.    Cardiovascular:  Negative for chest pain and leg swelling.   Gastrointestinal:  Negative for abdominal distention, abdominal pain, constipation, diarrhea, nausea and vomiting.   Endocrine: Negative for polydipsia, " "polyphagia and polyuria.   Genitourinary:  Negative for dysuria and frequency.   Musculoskeletal:  Positive for neck pain. Negative for arthralgias, myalgias and neck stiffness.   Skin:  Negative for color change.   Neurological:  Positive for headaches. Negative for dizziness, syncope, weakness and numbness.   Hematological:  Does not bruise/bleed easily.   Psychiatric/Behavioral:  Negative for behavioral problems, confusion, self-injury, sleep disturbance and suicidal ideas. The patient is not nervous/anxious.            Current Vitals:   Blood Pressure: 134/90 (04/02/24 1405)  Pulse: 62 (04/02/24 1405)  Temperature: 98.7 °F (37.1 °C) (04/02/24 1405)  Temp Source: Tympanic (04/02/24 1405)  Respirations: 18 (04/02/24 1405)  Height: 5' 2\" (157.5 cm) (04/02/24 1405)  Weight - Scale: 63 kg (138 lb 12.8 oz) (04/02/24 1405)  SpO2: 99 % (04/02/24 1405)  [unfilled]    PHYSICAL EXAMS:  Physical Exam  Constitutional:       Appearance: Normal appearance.   HENT:      Head: Normocephalic and atraumatic.      Right Ear: Tympanic membrane, ear canal and external ear normal.      Left Ear: Tympanic membrane, ear canal and external ear normal.      Nose: Mucosal edema and congestion present.      Right Sinus: Frontal sinus tenderness present.      Mouth/Throat:      Mouth: Mucous membranes are moist.      Pharynx: Oropharynx is clear. No oropharyngeal exudate or posterior oropharyngeal erythema.   Eyes:      Extraocular Movements: Extraocular movements intact.      Conjunctiva/sclera: Conjunctivae normal.      Pupils: Pupils are equal, round, and reactive to light.   Cardiovascular:      Rate and Rhythm: Normal rate and regular rhythm.      Pulses: Normal pulses.      Heart sounds: Normal heart sounds.   Musculoskeletal:         General: Normal range of motion.      Cervical back: Normal range of motion.   Skin:     General: Skin is warm.   Neurological:      General: No focal deficit present.      Mental Status: She is alert " and oriented to person, place, and time.   Psychiatric:         Mood and Affect: Mood normal.         Behavior: Behavior normal.         Thought Content: Thought content normal.             Lab, imaging and other studies: I have personally reviewed pertinent reports.  .

## 2024-04-29 ENCOUNTER — OFFICE VISIT (OUTPATIENT)
Dept: FAMILY MEDICINE CLINIC | Facility: CLINIC | Age: 39
End: 2024-04-29
Payer: COMMERCIAL

## 2024-04-29 VITALS
DIASTOLIC BLOOD PRESSURE: 60 MMHG | TEMPERATURE: 98.8 F | OXYGEN SATURATION: 99 % | HEART RATE: 80 BPM | BODY MASS INDEX: 25.21 KG/M2 | HEIGHT: 62 IN | RESPIRATION RATE: 18 BRPM | SYSTOLIC BLOOD PRESSURE: 100 MMHG | WEIGHT: 137 LBS

## 2024-04-29 DIAGNOSIS — Z00.00 ENCOUNTER FOR WELL ADULT EXAM WITHOUT ABNORMAL FINDINGS: Primary | ICD-10-CM

## 2024-04-29 PROCEDURE — 99395 PREV VISIT EST AGE 18-39: CPT | Performed by: NURSE PRACTITIONER

## 2024-04-29 NOTE — PROGRESS NOTES
ADULT ANNUAL PHYSICAL  [facility]    NAME: Myrtle Handley  AGE: 38 y.o. SEX: female  : 1985     DATE: 2024     Assessment and Plan:     Problem List Items Addressed This Visit    None  Visit Diagnoses       Encounter for well adult exam without abnormal findings    -  Primary            Immunizations and preventive care screenings were discussed with patient today. Appropriate education was printed on patient's after visit summary.    Counseling:  Alcohol/drug use: discussed moderation in alcohol intake, the recommendations for healthy alcohol use, and avoidance of illicit drug use.  Dental Health: discussed importance of regular tooth brushing, flossing, and dental visits.  Injury prevention: discussed safety/seat belts, safety helmets, smoke detectors, carbon dioxide detectors, and smoking near bedding or upholstery.  Sexual health: discussed sexually transmitted diseases, partner selection, use of condoms, avoidance of unintended pregnancy, and contraceptive alternatives.  Exercise: the importance of regular exercise/physical activity was discussed. Recommend exercise 3-5 times per week for at least 30 minutes.          No follow-ups on file.     Chief Complaint:     Chief Complaint   Patient presents with    Physical Exam      History of Present Illness:     Adult Annual Physical   Patient here for a comprehensive physical exam. The patient reports no problems.    Diet and Physical Activity  Diet/Nutrition: well balanced diet.   Exercise: walking.      Depression Screening  PHQ-2/9 Depression Screening    Little interest or pleasure in doing things: 0 - not at all  Feeling down, depressed, or hopeless: 0 - not at all  Trouble falling or staying asleep, or sleeping too much: 1 - several days  Feeling tired or having little energy: 0 - not at all  Poor appetite or overeatin - not at all  Feeling bad about yourself - or that you are  a failure or have let yourself or your family down: 0 - not at all  Trouble concentrating on things, such as reading the newspaper or watching television: 0 - not at all  Moving or speaking so slowly that other people could have noticed. Or the opposite - being so fidgety or restless that you have been moving around a lot more than usual: 0 - not at all  Thoughts that you would be better off dead, or of hurting yourself in some way: 0 - not at all       General Health  Sleep: gets 7-8 hours of sleep on average.   Hearing: normal - bilateral.  Vision: wears glasses.   Dental: regular dental visits.       /GYN Health  Follows with gynecology? yes   Last menstrual period: regular  Contraceptive method: IUD placement.  History of STDs?: no.     Advanced Care Planning  Do you have an advanced directive? no  Do you have a durable medical power of ? no  ACP document given to the patient? no      Review of Systems:     Review of Systems   Constitutional:  Negative for activity change, chills, fatigue and fever.   HENT:  Negative for congestion, ear discharge, ear pain, sinus pressure, sinus pain, sore throat, tinnitus and trouble swallowing.    Eyes:  Negative for photophobia, pain, discharge, itching and visual disturbance.   Respiratory:  Negative for cough, chest tightness, shortness of breath and wheezing.    Cardiovascular:  Negative for chest pain and leg swelling.   Gastrointestinal:  Negative for abdominal distention, abdominal pain, constipation, diarrhea, nausea and vomiting.   Endocrine: Negative for polydipsia, polyphagia and polyuria.   Genitourinary:  Negative for dysuria and frequency.   Musculoskeletal:  Negative for arthralgias, myalgias, neck pain and neck stiffness.   Skin:  Negative for color change.   Neurological:  Negative for dizziness, syncope, weakness, numbness and headaches.   Hematological:  Does not bruise/bleed easily.   Psychiatric/Behavioral:  Negative for behavioral problems,  "confusion, self-injury, sleep disturbance and suicidal ideas. The patient is not nervous/anxious.       Past Medical History:     Past Medical History:   Diagnosis Date    A-fib (HCC)     Allergic     Arthritis     Depression     Hypertension     with pregnancy    Pre-eclampsia       Past Surgical History:     Past Surgical History:   Procedure Laterality Date    KNEE ARTHROSCOPY        Social History:     Social History     Socioeconomic History    Marital status: Single     Spouse name: None    Number of children: None    Years of education: None    Highest education level: None   Occupational History    None   Tobacco Use    Smoking status: Never    Smokeless tobacco: Never    Tobacco comments:     smokes 1 cigarette \"socially\"    Vaping Use    Vaping status: Never Used   Substance and Sexual Activity    Alcohol use: Not Currently     Comment: socially    Drug use: Never    Sexual activity: None   Other Topics Concern    None   Social History Narrative    None     Social Determinants of Health     Financial Resource Strain: Not on file   Food Insecurity: Not on file   Transportation Needs: Not on file   Physical Activity: Not on file   Stress: Not on file   Social Connections: Not on file   Intimate Partner Violence: Not on file   Housing Stability: Not on file      Family History:     Family History   Problem Relation Age of Onset    Asthma Mother     Anemia Mother     Arthritis Mother       Current Medications:     Current Outpatient Medications   Medication Sig Dispense Refill    ALPRAZolam (XANAX) 0.25 mg tablet TAKE ONE (1) TABLET BY MOUTH DAILY AS NEEDED FOR SEVERE ANXIETY      cyclobenzaprine (FLEXERIL) 10 mg tablet Take 1 tablet (10 mg total) by mouth 3 (three) times a day as needed for muscle spasms 90 tablet 0    dicyclomine (BENTYL) 20 mg tablet Take 1 tablet (20 mg total) by mouth every 6 (six) hours 90 tablet 1    ergocalciferol (VITAMIN D2) 50,000 units Take 1 capsule (50,000 Units total) by mouth " "once a week 12 capsule 1    ibuprofen (MOTRIN) 800 mg tablet Take 1 tablet (800 mg total) by mouth every 8 (eight) hours as needed for mild pain 21 tablet 0    predniSONE 10 mg tablet 30 mg by mouth daily for 3 days, then 20 mg by mouth daily for 3 days, then 10 mg by mouth daily for 3 days, then stop (Patient not taking: Reported on 4/29/2024) 18 tablet 0     No current facility-administered medications for this visit.      Allergies:     Allergies   Allergen Reactions    Other Other (See Comments)     Shrimp    Pepcid [Famotidine]     Shellfish-Derived Products - Food Allergy       Physical Exam:     /60 (BP Location: Left arm, Patient Position: Sitting, Cuff Size: Standard)   Pulse 80   Temp 98.8 °F (37.1 °C) (Tympanic)   Resp 18   Ht 5' 2\" (1.575 m)   Wt 62.1 kg (137 lb)   SpO2 99%   BMI 25.06 kg/m²     Physical Exam  Vitals and nursing note reviewed.   Constitutional:       Appearance: Normal appearance. She is well-developed.   HENT:      Head: Normocephalic and atraumatic.   Cardiovascular:      Rate and Rhythm: Normal rate and regular rhythm.      Pulses: Normal pulses.   Pulmonary:      Effort: Pulmonary effort is normal.      Breath sounds: Normal breath sounds. No wheezing or rhonchi.   Abdominal:      General: There is no distension.      Tenderness: There is no abdominal tenderness.   Musculoskeletal:         General: No swelling, tenderness, deformity or signs of injury.      Cervical back: Normal range of motion and neck supple.      Right lower leg: No edema.      Left lower leg: No edema.   Skin:     Findings: No bruising, erythema, lesion or rash.   Neurological:      General: No focal deficit present.      Mental Status: She is alert and oriented to person, place, and time.   Psychiatric:         Mood and Affect: Mood normal.         Behavior: Behavior normal.         Thought Content: Thought content normal.         Judgment: Judgment normal.          Estela L Lobach, CRNP   [facility] [facility]

## 2024-06-17 DIAGNOSIS — F41.9 ANXIETY: Primary | ICD-10-CM

## 2024-06-17 DIAGNOSIS — F43.21 GRIEF AT LOSS OF CHILD: ICD-10-CM

## 2024-06-17 DIAGNOSIS — K58.0 IRRITABLE BOWEL SYNDROME WITH DIARRHEA: ICD-10-CM

## 2024-06-17 DIAGNOSIS — M54.41 CHRONIC BILATERAL LOW BACK PAIN WITH RIGHT-SIDED SCIATICA: ICD-10-CM

## 2024-06-17 DIAGNOSIS — E55.9 VITAMIN D DEFICIENCY: ICD-10-CM

## 2024-06-17 DIAGNOSIS — Z63.4 GRIEF AT LOSS OF CHILD: ICD-10-CM

## 2024-06-17 DIAGNOSIS — G89.29 CHRONIC BILATERAL LOW BACK PAIN WITH RIGHT-SIDED SCIATICA: ICD-10-CM

## 2024-06-17 SDOH — SOCIAL STABILITY - SOCIAL INSECURITY: DISSAPEARANCE AND DEATH OF FAMILY MEMBER: Z63.4

## 2024-06-18 RX ORDER — ERGOCALCIFEROL 1.25 MG/1
50000 CAPSULE ORAL WEEKLY
Qty: 12 CAPSULE | Refills: 0 | Status: SHIPPED | OUTPATIENT
Start: 2024-06-18

## 2024-06-18 RX ORDER — CYCLOBENZAPRINE HCL 10 MG
10 TABLET ORAL 3 TIMES DAILY PRN
Qty: 90 TABLET | Refills: 0 | Status: SHIPPED | OUTPATIENT
Start: 2024-06-18

## 2024-06-18 RX ORDER — DICYCLOMINE HCL 20 MG
20 TABLET ORAL EVERY 6 HOURS
Qty: 90 TABLET | Refills: 0 | Status: SHIPPED | OUTPATIENT
Start: 2024-06-18

## 2024-06-18 RX ORDER — ALPRAZOLAM 0.25 MG/1
0.25 TABLET ORAL
Qty: 10 TABLET | Refills: 0 | Status: SHIPPED | OUTPATIENT
Start: 2024-06-18

## 2024-07-26 NOTE — TELEPHONE ENCOUNTER
Skylar your A1c is showing improvement and has decreased to 6.4% which is awesome. You are now pre-diabetic. Keep up the great work! Upon review of the In Basket request we ask that you reach out the Liaisons via email for assistance  Any additional questions or concerns should be emailed to the Practice Liaisons via [email or web address] email, please do not reply via In Basket      Thank you  Nelli Lester

## 2024-08-13 ENCOUNTER — AMB VIDEO VISIT (OUTPATIENT)
Dept: OTHER | Facility: HOSPITAL | Age: 39
End: 2024-08-13

## 2024-08-13 DIAGNOSIS — Z63.4 GRIEF AT LOSS OF CHILD: ICD-10-CM

## 2024-08-13 DIAGNOSIS — J01.40 ACUTE NON-RECURRENT PANSINUSITIS: Primary | ICD-10-CM

## 2024-08-13 DIAGNOSIS — F43.21 GRIEF AT LOSS OF CHILD: ICD-10-CM

## 2024-08-13 DIAGNOSIS — F41.9 ANXIETY: ICD-10-CM

## 2024-08-13 PROBLEM — Z00.8 MEDICAL CLEARANCE FOR PSYCHIATRIC ADMISSION: Status: RESOLVED | Noted: 2021-08-06 | Resolved: 2024-08-13

## 2024-08-13 PROBLEM — U07.1 COVID-19 VIRUS INFECTION: Status: RESOLVED | Noted: 2021-12-28 | Resolved: 2024-08-13

## 2024-08-13 PROBLEM — B34.9 VIRAL ILLNESS: Status: RESOLVED | Noted: 2023-12-27 | Resolved: 2024-08-13

## 2024-08-13 PROBLEM — I48.91 ATRIAL FIBRILLATION (HCC): Status: RESOLVED | Noted: 2020-03-30 | Resolved: 2024-08-13

## 2024-08-13 PROCEDURE — ECARE PR SL URGENT CARE VIRTUAL VISIT: Performed by: PHYSICIAN ASSISTANT

## 2024-08-13 RX ORDER — ALPRAZOLAM 0.25 MG
TABLET ORAL
Qty: 10 TABLET | Refills: 0 | Status: SHIPPED | OUTPATIENT
Start: 2024-08-13

## 2024-08-13 RX ORDER — PSEUDOEPHEDRINE HCL 120 MG/1
120 TABLET, FILM COATED, EXTENDED RELEASE ORAL 2 TIMES DAILY
Qty: 20 TABLET | Refills: 0 | Status: SHIPPED | OUTPATIENT
Start: 2024-08-13

## 2024-08-13 SDOH — SOCIAL STABILITY - SOCIAL INSECURITY: DISSAPEARANCE AND DEATH OF FAMILY MEMBER: Z63.4

## 2024-08-13 NOTE — PROGRESS NOTES
Required Documentation:  Encounter provider: Shannon D Severino, PA-C    Identify all parties in room with patient during virtual visit:  No one else    The patient was identified by name and date of birth. Myrtle Handley was informed that this is a telemedicine visit and that the visit is being conducted through the [employer] platform. She agrees to proceed..  My office door was closed. No one else was in the room.  She acknowledged consent and understanding of privacy and security of the video platform. The patient has agreed to participate and understands they can discontinue the visit at any time.    Verification of patient location:  Patient is located at Home in the following state in which I hold an active license [place]    Patient is aware this is a billable service.     Reason for visit is No chief complaint on file.       Subjective  HPI   Pt reports sinus infection starting last Monday with allergy like sx-itchy ears and throat, runny nose. Took benadryl. Turned into nasal congestion. Tried nasal saline rinses. This morning discharge is yellow and now having pressure in maxillary sinuses and frontal sinuses. Took OTC decongestant, tylenol, motrin without relief. COVID negative    Past Medical History:   Diagnosis Date    A-fib (HCC)     Allergic     Arthritis     Atrial fibrillation (HCC) 03/30/2020    Call Dr. Lacey (Cards) after delivery to see inpatient     Last Assessment & Plan:   Call Dr. Lacey (Cards) after delivery to see inpatient  Had eval recently with halter x 2d.  Needs to return and get results.      Depression     Hypertension     with pregnancy    Pre-eclampsia        Past Surgical History:   Procedure Laterality Date    KNEE ARTHROSCOPY          Allergies   Allergen Reactions    Other Other (See Comments)     Shrimp    Pepcid [Famotidine]     Shellfish-Derived Products - Food Allergy        Review of Systems   Constitutional:  Negative for fever.   HENT:  Positive for congestion,  postnasal drip, rhinorrhea, sinus pressure and sinus pain. Negative for nosebleeds.    Eyes:  Negative for redness.   Respiratory:  Negative for shortness of breath.    Cardiovascular:  Negative for chest pain.   Gastrointestinal:  Negative for blood in stool.   Genitourinary:  Negative for hematuria.   Musculoskeletal:  Negative for gait problem.   Skin:  Negative for rash.   Neurological:  Negative for seizures.   Psychiatric/Behavioral:  Negative for behavioral problems.        Video Exam    There were no vitals filed for this visit.    Physical Exam  Constitutional:       General: She is not in acute distress.     Appearance: Normal appearance. She is not toxic-appearing.   HENT:      Head: Normocephalic and atraumatic.      Nose: No rhinorrhea.      Comments: Sounds congested     Mouth/Throat:      Mouth: Mucous membranes are moist.   Eyes:      Conjunctiva/sclera: Conjunctivae normal.      Comments: glasses   Pulmonary:      Effort: Pulmonary effort is normal. No respiratory distress.      Breath sounds: No wheezing (no gross audible wheeze through computer).   Musculoskeletal:      Cervical back: Normal range of motion.   Skin:     Findings: No rash (on face or neck).   Neurological:      Mental Status: She is alert.      Cranial Nerves: No dysarthria or facial asymmetry.   Psychiatric:         Mood and Affect: Mood normal.         Behavior: Behavior normal.         Visit Time  Total Visit Duration: 8 minutes    Assessment/Plan:    Diagnoses and all orders for this visit:    Acute non-recurrent pansinusitis  -     amoxicillin-clavulanate (AUGMENTIN) 875-125 mg per tablet; Take 1 tablet by mouth 2 (two) times a day for 10 days  -     pseudoephedrine (SUDAFED) 120 MG 12 hr tablet; Take 1 tablet (120 mg total) by mouth 2 (two) times a day        Patient Instructions   Sinus infections are typically viral and will get better on their own in 7-10 days. You should try symptomatic relief in the meantime with OTC  "antihistamine (Claritin or Zyrtec), Afrin for up to 3 days then switch to Flonase, and Sudafed (behind the counter) and mucinex. You can also try sinus rinses with a neti pot or nasal lavage (be sure to use distilled water.) Sleep with a cool mist humidifier at your bedside. If your symptoms do not improve after 7-10 days, you may need antibiotics because it is more likely to be bacterial at that point.  Follow-up with your primary care physician for recheck in 2-3 business days. Go to the ER for sudden severe headache, high fevers, change in vision, seizure activity or anything else that is concerning.    [facility] Phone number is [phone] if you need assistance or have further questions  note in \"Letters\" section of [employer]. Can print if opened from a web browser    "

## 2024-08-13 NOTE — PATIENT INSTRUCTIONS
"Sinus infections are typically viral and will get better on their own in 7-10 days. You should try symptomatic relief in the meantime with OTC antihistamine (Claritin or Zyrtec), Afrin for up to 3 days then switch to Flonase, and Sudafed (behind the counter) and mucinex. You can also try sinus rinses with a neti pot or nasal lavage (be sure to use distilled water.) Sleep with a cool mist humidifier at your bedside. If your symptoms do not improve after 7-10 days, you may need antibiotics because it is more likely to be bacterial at that point.  Follow-up with your primary care physician for recheck in 2-3 business days. Go to the ER for sudden severe headache, high fevers, change in vision, seizure activity or anything else that is concerning.    [facility] Phone number is [phone] if you need assistance or have further questions  note in \"Letters\" section of [employer]. Can print if opened from a web browser    "

## 2024-08-13 NOTE — CARE ANYWHERE EVISITS
Visit Summary for ALEJANDRINA WHEELER - Gender: Female - Date of Birth: 1985  Date: [id number] - Duration: 7 minutes  Patient: ALEJANDRINA WHEELER  Provider: Shannon Severino PA-C    Patient Contact Information  Address  [place] [address]  [phone]    Visit Topics  sinus infection symptoms  [Added By: Self - 2024-08-13]    Triage Questions   What is your current physical address in the event of a medical emergency? Answer []  Are you allergic to any medications? Answer []  Are you now or could you be pregnant? Answer []  Do you have any immune system compromise or chronic lung   disease? Answer []  Do you have any vulnerable family members in the home (infant, pregnant, cancer, elderly)? Answer []     Conversation Transcripts  [0A][0A] [Notification] You are connected with Shannon Severino PA-C, Urgent Care Specialist.[0A][Notification] ALEJANDRINA WHEELER is located in [place].[0A][Notification] ALEJANDRINA WHEELER has shared health history...[0A]    Diagnosis  Acute pansinusitis    Procedures  Value: [id number] Code: CPT-4 UNLISTED E&M SERVICE    Medications Prescribed    No prescriptions ordered    Electronically signed by: Severino PA-C, Shannon(NPI [id number])

## 2024-08-31 DIAGNOSIS — K58.0 IRRITABLE BOWEL SYNDROME WITH DIARRHEA: ICD-10-CM

## 2024-09-01 RX ORDER — DICYCLOMINE HCL 20 MG
20 TABLET ORAL EVERY 6 HOURS
Qty: 120 TABLET | Refills: 5 | Status: SHIPPED | OUTPATIENT
Start: 2024-09-01

## 2024-09-09 ENCOUNTER — OFFICE VISIT (OUTPATIENT)
Dept: FAMILY MEDICINE CLINIC | Facility: CLINIC | Age: 39
End: 2024-09-09
Payer: COMMERCIAL

## 2024-09-09 VITALS
OXYGEN SATURATION: 97 % | BODY MASS INDEX: 26.17 KG/M2 | DIASTOLIC BLOOD PRESSURE: 62 MMHG | TEMPERATURE: 96.7 F | HEART RATE: 84 BPM | HEIGHT: 62 IN | SYSTOLIC BLOOD PRESSURE: 100 MMHG | RESPIRATION RATE: 20 BRPM | WEIGHT: 142.2 LBS

## 2024-09-09 DIAGNOSIS — E55.9 VITAMIN D DEFICIENCY: ICD-10-CM

## 2024-09-09 DIAGNOSIS — G44.86 CERVICOGENIC HEADACHE: Primary | ICD-10-CM

## 2024-09-09 DIAGNOSIS — F33.1 MODERATE EPISODE OF RECURRENT MAJOR DEPRESSIVE DISORDER (HCC): ICD-10-CM

## 2024-09-09 PROCEDURE — 99214 OFFICE O/P EST MOD 30 MIN: CPT | Performed by: NURSE PRACTITIONER

## 2024-09-09 RX ORDER — ERGOCALCIFEROL 1.25 MG/1
50000 CAPSULE, LIQUID FILLED ORAL WEEKLY
Qty: 12 CAPSULE | Refills: 0 | Status: SHIPPED | OUTPATIENT
Start: 2024-09-09

## 2024-09-09 RX ORDER — METHOCARBAMOL 500 MG/1
500 TABLET, FILM COATED ORAL 3 TIMES DAILY
Qty: 30 TABLET | Refills: 0 | Status: SHIPPED | OUTPATIENT
Start: 2024-09-09

## 2024-09-09 NOTE — PROGRESS NOTES
"OFFICE VISIT  Myrtle Handley 39 y.o. female MRN: [id number]          Assessment / Plan:  Problem List Items Addressed This Visit          Behavioral Health    Moderate episode of recurrent major depressive disorder (HCC)     Stable             Surgery/Wound/Pain    Cervicogenic headache - Primary     Start PT, advise trying methocarbamol due to flexeril being too drowsy          Relevant Medications    methocarbamol (ROBAXIN) 500 mg tablet    Other Relevant Orders    Ambulatory Referral to Physical Therapy       Other    Vitamin D deficiency    Relevant Medications    ergocalciferol (VITAMIN D2) 50,000 units         Reason For Visit / Chief Complaint  Chief Complaint   Patient presents with    Headache     Getting worse.         HPI:  Myrtle Handley is a 39 y.o. female who presents today for headaches. She reports increase in tension headaches, she reports neck pain, into head. She reports doing neck stretches. No relief of nsaids, aleve. She has used flexeril with good relief with flexeril although is unable to drive. She reports neck pain daily.     Historical Information   Past Medical History:   Diagnosis Date    A-fib (HCC)     Allergic     Arthritis     Atrial fibrillation (HCC) 03/30/2020    Call Dr. Lacey (Cards) after delivery to see inpatient     Last Assessment & Plan:   Call Dr. Lacey (Cards) after delivery to see inpatient  Had eval recently with halter x 2d.  Needs to return and get results.      Depression     Hypertension     with pregnancy    Pre-eclampsia      Past Surgical History:   Procedure Laterality Date    KNEE ARTHROSCOPY       Social History   Social History     Substance and Sexual Activity   Alcohol Use Not Currently    Comment: socially     Social History     Substance and Sexual Activity   Drug Use Never     Social History     Tobacco Use   Smoking Status Never   Smokeless Tobacco Never   Tobacco Comments    smokes 1 cigarette \"socially\"      Family History   Problem Relation Age of " Onset    Asthma Mother     Anemia Mother     Arthritis Mother        Meds/Allergies   Allergies   Allergen Reactions    Other Other (See Comments)     Shrimp    Pepcid [Famotidine]     Shellfish-Derived Products - Food Allergy        Meds:    Current Outpatient Medications:     ALPRAZolam (XANAX) 0.25 mg tablet, TAKE 1 TABLET BY MOUTH DAILY AT BEDTIME AS NEEDED FOR ANXIETY, Disp: 10 tablet, Rfl: 0    dicyclomine (BENTYL) 20 mg tablet, TAKE 1 TABLET BY MOUTH EVERY 6 HOURS., Disp: 120 tablet, Rfl: 5    ergocalciferol (VITAMIN D2) 50,000 units, Take 1 capsule (50,000 Units total) by mouth once a week, Disp: 12 capsule, Rfl: 0    ibuprofen (MOTRIN) 800 mg tablet, Take 1 tablet (800 mg total) by mouth every 8 (eight) hours as needed for mild pain, Disp: 21 tablet, Rfl: 0    methocarbamol (ROBAXIN) 500 mg tablet, Take 1 tablet (500 mg total) by mouth 3 (three) times a day, Disp: 30 tablet, Rfl: 0    pseudoephedrine (SUDAFED) 120 MG 12 hr tablet, Take 1 tablet (120 mg total) by mouth 2 (two) times a day (Patient not taking: Reported on 9/9/2024), Disp: 20 tablet, Rfl: 0      REVIEW OF SYSTEMS  Review of Systems   Constitutional:  Negative for activity change, chills, fatigue and fever.   HENT:  Negative for congestion, ear discharge, ear pain, sinus pressure, sinus pain, sore throat, tinnitus and trouble swallowing.    Eyes:  Negative for photophobia, pain, discharge, itching and visual disturbance.   Respiratory:  Negative for cough, chest tightness, shortness of breath and wheezing.    Cardiovascular:  Negative for chest pain and leg swelling.   Gastrointestinal:  Negative for abdominal distention, abdominal pain, constipation, diarrhea, nausea and vomiting.   Endocrine: Negative for polydipsia, polyphagia and polyuria.   Genitourinary:  Negative for dysuria and frequency.   Musculoskeletal:  Positive for neck pain. Negative for arthralgias, myalgias and neck stiffness.   Skin:  Negative for color change.   Neurological:  " Positive for headaches. Negative for dizziness, syncope, weakness and numbness.   Hematological:  Does not bruise/bleed easily.   Psychiatric/Behavioral:  Negative for behavioral problems, confusion, self-injury, sleep disturbance and suicidal ideas. The patient is not nervous/anxious.            Current Vitals:   Blood Pressure: 100/62 (09/09/24 1414)  Pulse: 84 (09/09/24 1414)  Temperature: (!) 96.7 °F (35.9 °C) (09/09/24 1414)  Temp Source: Tympanic (09/09/24 1414)  Respirations: 20 (09/09/24 1414)  Height: 5' 2\" (157.5 cm) (09/09/24 1414)  Weight - Scale: 64.5 kg (142 lb 3.2 oz) (09/09/24 1414)  SpO2: 97 % (09/09/24 1414)  [unfilled]    PHYSICAL EXAMS:  Physical Exam  Vitals and nursing note reviewed.   Constitutional:       Appearance: Normal appearance. She is well-developed.   HENT:      Head: Normocephalic and atraumatic.   Cardiovascular:      Rate and Rhythm: Normal rate and regular rhythm.      Pulses: Normal pulses.      Heart sounds: Normal heart sounds.   Pulmonary:      Effort: Pulmonary effort is normal.      Breath sounds: Normal breath sounds. No wheezing or rhonchi.   Abdominal:      General: There is no distension.      Tenderness: There is no abdominal tenderness.   Musculoskeletal:         General: No swelling, tenderness, deformity or signs of injury. Normal range of motion.      Cervical back: Normal range of motion and neck supple.      Right lower leg: No edema.      Left lower leg: No edema.   Skin:     General: Skin is warm.      Findings: No bruising, erythema, lesion or rash.   Neurological:      General: No focal deficit present.      Mental Status: She is alert and oriented to person, place, and time.   Psychiatric:         Mood and Affect: Mood normal.         Behavior: Behavior normal.         Thought Content: Thought content normal.         Judgment: Judgment normal.             Lab, imaging and other studies: I have personally reviewed pertinent reports.  .             "

## 2024-10-02 DIAGNOSIS — K58.0 IRRITABLE BOWEL SYNDROME WITH DIARRHEA: ICD-10-CM

## 2024-10-02 RX ORDER — DICYCLOMINE HCL 20 MG
20 TABLET ORAL EVERY 6 HOURS
Qty: 360 TABLET | Refills: 1 | Status: SHIPPED | OUTPATIENT
Start: 2024-10-02

## 2024-12-07 DIAGNOSIS — Z63.4 GRIEF AT LOSS OF CHILD: ICD-10-CM

## 2024-12-07 DIAGNOSIS — F41.9 ANXIETY: ICD-10-CM

## 2024-12-07 DIAGNOSIS — F43.21 GRIEF AT LOSS OF CHILD: ICD-10-CM

## 2024-12-07 SDOH — SOCIAL STABILITY - SOCIAL INSECURITY: DISSAPEARANCE AND DEATH OF FAMILY MEMBER: Z63.4

## 2024-12-09 RX ORDER — ALPRAZOLAM 0.25 MG/1
0.25 TABLET ORAL
Qty: 10 TABLET | Refills: 0 | Status: SHIPPED | OUTPATIENT
Start: 2024-12-09

## 2025-01-02 ENCOUNTER — TELEPHONE (OUTPATIENT)
Age: 40
End: 2025-01-02

## 2025-01-02 NOTE — TELEPHONE ENCOUNTER
Pt reports both her daughters were in the ER for Flu, and pt is starting to have body aches, cough and temp-100.1. Pt is taking OTC meds and their helping. Pt reports this started yesterday.   Pt also wanted to let you know matteo is feeling this way as well.  Triage nurse offered pt an appt to be tested for flu, pt refused at this time and stated she wanted to speak to the provider directly if she has time and wanted to know if she could have tamiful prescribed.  PCP please call pt back to further advise.

## 2025-01-02 NOTE — TELEPHONE ENCOUNTER
Please see message from POD    I spoke to patient, she was made aware that you are out of office til Monday

## 2025-01-07 ENCOUNTER — TELEMEDICINE (OUTPATIENT)
Dept: OTHER | Facility: HOSPITAL | Age: 40
End: 2025-01-07
Payer: COMMERCIAL

## 2025-01-07 VITALS — TEMPERATURE: 97.8 F | OXYGEN SATURATION: 97 %

## 2025-01-07 DIAGNOSIS — J01.40 ACUTE NON-RECURRENT PANSINUSITIS: Primary | ICD-10-CM

## 2025-01-07 PROCEDURE — 99213 OFFICE O/P EST LOW 20 MIN: CPT | Performed by: PHYSICIAN ASSISTANT

## 2025-01-07 NOTE — PROGRESS NOTES
Virtual Regular Visit  Name: Myrtle Handley      : 1985      MRN: [id number]  Encounter Provider: Shannon D Severino, PA-C  Encounter Date: 2025   Encounter department: VIRTUAL CARE       Verification of patient location:  Patient is located at Home in the following state in which I hold an active license [place] :  Assessment & Plan  Acute non-recurrent pansinusitis    Orders:    amoxicillin-clavulanate (AUGMENTIN) 875-125 mg per tablet; Take 1 tablet by mouth 2 (two) times a day for 10 days        Encounter provider Shannon D Severino, PA-C    The patient was identified by name and date of birth. Myrtle Handley was informed that this is a telemedicine visit and that the visit is being conducted through the Epic Embedded platform. She agrees to proceed..  My office door was closed. No one else was in the room.  She acknowledged consent and understanding of privacy and security of the video platform. The patient has agreed to participate and understands they can discontinue the visit at any time.    Patient is aware this is a billable service.     History was obtained from: History obtained from: patient and daughter in room  History of Present Illness     Pt reports sinus infection to R side of face radiating to teeth. Daughters had the flu last week, and she had similar sx last week, better by  (2 days ago). Then sinus pressure started yesterday. Sudafed and theraflu with some relief.       Review of Systems   Constitutional:  Negative for fever.   HENT:  Positive for congestion, sinus pressure and sinus pain. Negative for nosebleeds.    Eyes:  Negative for redness.   Respiratory:  Negative for shortness of breath.    Cardiovascular:  Negative for chest pain.   Gastrointestinal:  Negative for blood in stool.   Genitourinary:  Negative for hematuria.   Musculoskeletal:  Negative for gait problem.   Skin:  Negative for rash.   Neurological:  Negative for seizures.   Psychiatric/Behavioral:  Negative  for behavioral problems.        Objective   Temp 97.8 °F (36.6 °C)   SpO2 97%     Physical Exam  Constitutional:       General: She is not in acute distress.     Appearance: Normal appearance. She is not toxic-appearing.   HENT:      Head: Normocephalic and atraumatic.      Nose: Congestion present. No rhinorrhea.      Right Sinus: Maxillary sinus tenderness and frontal sinus tenderness present.      Mouth/Throat:      Mouth: Mucous membranes are moist.   Eyes:      Conjunctiva/sclera: Conjunctivae normal.   Pulmonary:      Effort: Pulmonary effort is normal. No respiratory distress.      Breath sounds: No wheezing (no gross audible wheeze through computer).   Musculoskeletal:      Cervical back: Normal range of motion.   Skin:     Findings: No rash (on face or neck).   Neurological:      Mental Status: She is alert.      Cranial Nerves: No dysarthria or facial asymmetry.   Psychiatric:         Mood and Affect: Mood normal.         Behavior: Behavior normal.         Visit Time  Total Visit Duration: 7 minutes not including the time spent for establishing the audio/video connection.

## 2025-01-07 NOTE — PATIENT INSTRUCTIONS
"As discussed, sinus infections are typically viral and will get better on their own in 7-10 days. You should try symptomatic relief in the meantime with OTC antihistamine (Claritin or Zyrtec), Afrin for up to 3 days then switch to Flonase, and Sudafed (behind the counter) and mucinex. You can also try sinus rinses with a neti pot or nasal lavage (be sure to use distilled water.) Sleep with a cool mist humidifier at your bedside. If your symptoms do not improve after 7-10 days, you may need antibiotics because it is more likely to be bacterial at that point.  Follow-up with your primary care physician for recheck in 2-3 business days. Go to the ER for sudden severe headache, high fevers, change in vision, seizure activity or anything else that is concerning.    [facility] Phone number is [phone] if you need assistance or have further questions  note in \"Letters\" section of [employer]. Can print if opened from a web browser   "

## 2025-01-28 NOTE — PROGRESS NOTES
Thank you for choosing our Emergency Department for your care.  It is our privilege to care for you in your time of need.  In the next several days, you may receive a survey via email or mailed to your home about your experience with our team.  We would greatly appreciate you taking a few minutes to complete the survey, as we use this information to learn what we have done well and what we could be doing better. Thank you for trusting us with your care!    Below you will find a list of your tests from today's visit.   Labs and Radiology Studies  No results found for this or any previous visit (from the past 12 hour(s)).  No results found.  ------------------------------------------------------------------------------------------------------------  The evaluation and treatment you received in the Emergency Department were for an urgent problem. It is important that you follow-up with a doctor, nurse practitioner, or physician assistant to:  (1) confirm your diagnosis,  (2) re-evaluation of changes in your illness and treatment, and (3) for ongoing care. Please take your discharge instructions with you when you go to your follow-up appointment.     If you have any problem arranging a follow-up appointment, contact us!  If your symptoms become worse or you do not improve as expected, please return to us. We are available 24 hours a day.     If a prescription has been provided, please fill it as soon as possible to prevent a delay in treatment. If you have any questions or reservations about taking the medication due to side effects or interactions with other medications, please call your primary care provider or contact us directly.  Again, THANK YOU for choosing us to care for YOU!     Pt   Slept  Remainder  Of  Night without  Any periods  Of  Restlessness observed

## 2025-03-02 DIAGNOSIS — G44.86 CERVICOGENIC HEADACHE: ICD-10-CM

## 2025-03-03 RX ORDER — METHOCARBAMOL 500 MG/1
500 TABLET, FILM COATED ORAL 3 TIMES DAILY
Qty: 30 TABLET | Refills: 0 | Status: SHIPPED | OUTPATIENT
Start: 2025-03-03

## 2025-05-13 ENCOUNTER — OFFICE VISIT (OUTPATIENT)
Dept: FAMILY MEDICINE CLINIC | Facility: CLINIC | Age: 40
End: 2025-05-13

## 2025-05-13 VITALS
WEIGHT: 137 LBS | HEIGHT: 62 IN | OXYGEN SATURATION: 100 % | TEMPERATURE: 98.3 F | SYSTOLIC BLOOD PRESSURE: 120 MMHG | DIASTOLIC BLOOD PRESSURE: 80 MMHG | RESPIRATION RATE: 20 BRPM | HEART RATE: 84 BPM | BODY MASS INDEX: 25.21 KG/M2

## 2025-05-13 DIAGNOSIS — E55.9 VITAMIN D DEFICIENCY: ICD-10-CM

## 2025-05-13 DIAGNOSIS — F41.9 ANXIETY: ICD-10-CM

## 2025-05-13 DIAGNOSIS — G44.86 CERVICOGENIC HEADACHE: ICD-10-CM

## 2025-05-13 DIAGNOSIS — Z00.00 ENCOUNTER FOR WELL ADULT EXAM WITHOUT ABNORMAL FINDINGS: Primary | ICD-10-CM

## 2025-05-13 DIAGNOSIS — Z12.31 ENCOUNTER FOR SCREENING MAMMOGRAM FOR BREAST CANCER: ICD-10-CM

## 2025-05-13 DIAGNOSIS — Z63.4 GRIEF AT LOSS OF CHILD: ICD-10-CM

## 2025-05-13 DIAGNOSIS — F43.21 GRIEF AT LOSS OF CHILD: ICD-10-CM

## 2025-05-13 PROCEDURE — 99395 PREV VISIT EST AGE 18-39: CPT | Performed by: NURSE PRACTITIONER

## 2025-05-13 RX ORDER — ALPRAZOLAM 0.25 MG
0.25 TABLET ORAL
Qty: 10 TABLET | Refills: 0 | Status: SHIPPED | OUTPATIENT
Start: 2025-05-13

## 2025-05-13 RX ORDER — ERGOCALCIFEROL 1.25 MG/1
50000 CAPSULE, LIQUID FILLED ORAL WEEKLY
Qty: 12 CAPSULE | Refills: 0 | Status: SHIPPED | OUTPATIENT
Start: 2025-05-13

## 2025-05-13 RX ORDER — METHOCARBAMOL 500 MG/1
500 TABLET, FILM COATED ORAL 3 TIMES DAILY
Qty: 30 TABLET | Refills: 0 | Status: SHIPPED | OUTPATIENT
Start: 2025-05-13

## 2025-05-13 SDOH — SOCIAL STABILITY - SOCIAL INSECURITY: DISSAPEARANCE AND DEATH OF FAMILY MEMBER: Z63.4

## 2025-05-13 NOTE — PROGRESS NOTES
Adult Annual Physical  Name: Myrtle Handley      : 1985      MRN: [id number]  Encounter Provider: Estela Crawford DNP  Encounter Date: 2025   Encounter department: [facility]    :  Assessment & Plan  Encounter for well adult exam without abnormal findings         Anxiety    Orders:    ALPRAZolam (XANAX) 0.25 mg tablet; Take 1 tablet (0.25 mg total) by mouth daily at bedtime as needed for anxiety    Grief at loss of child    Orders:    ALPRAZolam (XANAX) 0.25 mg tablet; Take 1 tablet (0.25 mg total) by mouth daily at bedtime as needed for anxiety    Cervicogenic headache    Orders:    methocarbamol (ROBAXIN) 500 mg tablet; Take 1 tablet (500 mg total) by mouth 3 (three) times a day    Vitamin D deficiency    Orders:    ergocalciferol (VITAMIN D2) 50,000 units; Take 1 capsule (50,000 Units total) by mouth once a week    Encounter for screening mammogram for breast cancer    Orders:    Mammo screening bilateral w cad; Future        Preventive Screenings:    - Hepatitis C screening: screening up-to-date   - Lung cancer screening: screening not indicated       Depression Screening and Follow-up Plan: Patient's depression screening was positive with a PHQ-9 score of 6.   Patient assessed for underlying major depression. Brief counseling provided and recommend additional follow-up/re-evaluation next office visit.         History of Present Illness     Adult Annual Physical:  Patient presents for annual physical.     Diet and Physical Activity:  - Diet/Nutrition: well balanced diet.  - Exercise: walking and 1-2 times a week on average.    Depression Screening:    - PHQ-9 Score: 6    General Health:  - Sleep: sleeps poorly, 4-6 hours of sleep on average and unrefreshing sleep.  - Hearing: normal hearing right ear and normal hearing left ear.  - Vision: most recent eye exam < 1 year ago and wears glasses and contacts.  - Dental: regular dental visits and brushes teeth twice  "daily.    /GYN Health:  - Follows with GYN: yes.   - Last menstrual cycle: 5/11/2025.   - History of STDs: no  - Contraception: IUD placement.      Advanced Care Planning:  - Has an advanced directive?: no    - Has a durable medical POA?: no    - ACP document given to patient?: no      Review of Systems   Constitutional:  Negative for activity change, chills, fatigue and fever.   HENT:  Negative for congestion, ear discharge, ear pain, sinus pressure, sinus pain, sore throat, tinnitus and trouble swallowing.    Eyes:  Negative for photophobia, pain, discharge, itching and visual disturbance.   Respiratory:  Negative for cough, chest tightness, shortness of breath and wheezing.    Cardiovascular:  Negative for chest pain and leg swelling.   Gastrointestinal:  Negative for abdominal distention, abdominal pain, constipation, diarrhea, nausea and vomiting.   Endocrine: Negative for polydipsia, polyphagia and polyuria.   Genitourinary:  Negative for dysuria and frequency.   Musculoskeletal:  Negative for arthralgias, myalgias, neck pain and neck stiffness.   Skin:  Negative for color change.   Neurological:  Negative for dizziness, syncope, weakness, numbness and headaches.   Hematological:  Does not bruise/bleed easily.   Psychiatric/Behavioral:  Negative for behavioral problems, confusion, self-injury, sleep disturbance and suicidal ideas. The patient is not nervous/anxious.          Objective   /80 (BP Location: Right arm, Patient Position: Sitting, Cuff Size: Standard)   Pulse 84   Temp 98.3 °F (36.8 °C) (Tympanic)   Resp 20   Ht 5' 2\" (1.575 m)   Wt 62.1 kg (137 lb)   LMP 05/11/2025   SpO2 100%   BMI 25.06 kg/m²     Physical Exam  Vitals and nursing note reviewed.   Constitutional:       Appearance: Normal appearance. She is well-developed.   HENT:      Head: Normocephalic and atraumatic.      Right Ear: Tympanic membrane, ear canal and external ear normal.      Left Ear: Tympanic membrane, ear canal " and external ear normal.      Nose: Nose normal.      Mouth/Throat:      Mouth: Mucous membranes are moist.   Eyes:      Extraocular Movements: Extraocular movements intact.      Conjunctiva/sclera: Conjunctivae normal.      Pupils: Pupils are equal, round, and reactive to light.   Cardiovascular:      Rate and Rhythm: Normal rate and regular rhythm.      Pulses: Normal pulses.      Heart sounds: Normal heart sounds.   Pulmonary:      Breath sounds: No wheezing or rhonchi.   Abdominal:      General: There is no distension.      Tenderness: There is no abdominal tenderness.   Musculoskeletal:         General: No swelling, tenderness, deformity or signs of injury. Normal range of motion.      Cervical back: Normal range of motion and neck supple.      Right lower leg: No edema.      Left lower leg: No edema.   Skin:     General: Skin is warm.      Findings: No bruising, erythema, lesion or rash.   Neurological:      General: No focal deficit present.      Mental Status: She is alert and oriented to person, place, and time.   Psychiatric:         Mood and Affect: Mood normal.         Behavior: Behavior normal.         Thought Content: Thought content normal.         Judgment: Judgment normal.

## 2025-05-13 NOTE — ASSESSMENT & PLAN NOTE
Orders:    methocarbamol (ROBAXIN) 500 mg tablet; Take 1 tablet (500 mg total) by mouth 3 (three) times a day

## 2025-06-24 DIAGNOSIS — L20.82 FLEXURAL ECZEMA: Primary | ICD-10-CM

## 2025-06-24 RX ORDER — TRIAMCINOLONE ACETONIDE 1 MG/G
CREAM TOPICAL 2 TIMES DAILY
Qty: 80 G | Refills: 0 | Status: SHIPPED | OUTPATIENT
Start: 2025-06-24

## 2025-07-09 ENCOUNTER — APPOINTMENT (OUTPATIENT)
Dept: LAB | Facility: HOSPITAL | Age: 40
End: 2025-07-09
Payer: COMMERCIAL

## 2025-07-09 DIAGNOSIS — Z11.1 SCREENING FOR TUBERCULOSIS: ICD-10-CM

## 2025-07-09 DIAGNOSIS — Z01.84 IMMUNITY STATUS TESTING: Primary | ICD-10-CM

## 2025-07-09 DIAGNOSIS — Z01.84 IMMUNITY STATUS TESTING: ICD-10-CM

## 2025-07-09 PROCEDURE — 36415 COLL VENOUS BLD VENIPUNCTURE: CPT

## 2025-07-09 PROCEDURE — 86787 VARICELLA-ZOSTER ANTIBODY: CPT

## 2025-07-09 PROCEDURE — 86480 TB TEST CELL IMMUN MEASURE: CPT

## 2025-07-10 LAB
GAMMA INTERFERON BACKGROUND BLD IA-ACNC: 0.02 IU/ML
M TB IFN-G BLD-IMP: NEGATIVE
M TB IFN-G CD4+ BCKGRND COR BLD-ACNC: 0.13 IU/ML
M TB IFN-G CD4+ BCKGRND COR BLD-ACNC: 0.17 IU/ML
MITOGEN IGNF BCKGRD COR BLD-ACNC: 9.98 IU/ML
VZV IGG SER QL IA: 4.11 S/CO
VZV IGG SER QL IA: POSITIVE

## 2025-07-18 ENCOUNTER — PATIENT MESSAGE (OUTPATIENT)
Dept: FAMILY MEDICINE CLINIC | Facility: CLINIC | Age: 40
End: 2025-07-18

## 2025-07-18 NOTE — PATIENT COMMUNICATION
Patient called in to return missed call from office. Triage nurse attempted to warm tx pt to office, but at this time patient disconnected the call. Office staff stated the will call pt back.

## 2025-07-21 ENCOUNTER — CLINICAL SUPPORT (OUTPATIENT)
Dept: FAMILY MEDICINE CLINIC | Facility: CLINIC | Age: 40
End: 2025-07-21

## 2025-07-21 DIAGNOSIS — Z01.00 ENCOUNTER FOR VISION SCREENING: Primary | ICD-10-CM

## 2025-07-21 PROCEDURE — NURSE
